# Patient Record
Sex: MALE | URBAN - METROPOLITAN AREA
[De-identification: names, ages, dates, MRNs, and addresses within clinical notes are randomized per-mention and may not be internally consistent; named-entity substitution may affect disease eponyms.]

---

## 2020-01-01 ENCOUNTER — HOSPITAL ENCOUNTER (OUTPATIENT)
Facility: CLINIC | Age: 50
Discharge: HOME | End: 2020-06-19
Attending: INTERNAL MEDICINE | Admitting: INTERNAL MEDICINE
Payer: COMMERCIAL

## 2020-01-01 ENCOUNTER — APPOINTMENT (OUTPATIENT)
Dept: CT IMAGING | Facility: CLINIC | Age: 50
DRG: 003 | End: 2020-01-01
Attending: INTERNAL MEDICINE
Payer: COMMERCIAL

## 2020-01-01 ENCOUNTER — HOSPITAL ENCOUNTER (INPATIENT)
Facility: CLINIC | Age: 50
LOS: 1 days | DRG: 003 | End: 2020-06-20
Admitting: INTERNAL MEDICINE
Payer: COMMERCIAL

## 2020-01-01 ENCOUNTER — DOCUMENTATION ONLY (OUTPATIENT)
Dept: OTHER | Facility: CLINIC | Age: 50
End: 2020-01-01

## 2020-01-01 ENCOUNTER — APPOINTMENT (OUTPATIENT)
Dept: CARDIOLOGY | Facility: CLINIC | Age: 50
DRG: 003 | End: 2020-01-01
Attending: INTERNAL MEDICINE
Payer: COMMERCIAL

## 2020-01-01 ENCOUNTER — APPOINTMENT (OUTPATIENT)
Dept: GENERAL RADIOLOGY | Facility: CLINIC | Age: 50
DRG: 003 | End: 2020-01-01
Attending: INTERNAL MEDICINE
Payer: COMMERCIAL

## 2020-01-01 ENCOUNTER — APPOINTMENT (OUTPATIENT)
Dept: INTERVENTIONAL RADIOLOGY/VASCULAR | Facility: CLINIC | Age: 50
End: 2020-01-01
Attending: INTERNAL MEDICINE
Payer: COMMERCIAL

## 2020-01-01 ENCOUNTER — APPOINTMENT (OUTPATIENT)
Dept: GENERAL RADIOLOGY | Facility: CLINIC | Age: 50
DRG: 003 | End: 2020-01-01
Payer: COMMERCIAL

## 2020-01-01 ENCOUNTER — APPOINTMENT (OUTPATIENT)
Dept: ULTRASOUND IMAGING | Facility: CLINIC | Age: 50
DRG: 003 | End: 2020-01-01
Attending: INTERNAL MEDICINE
Payer: COMMERCIAL

## 2020-01-01 VITALS
TEMPERATURE: 93.9 F | HEIGHT: 76 IN | RESPIRATION RATE: 12 BRPM | BODY MASS INDEX: 38.36 KG/M2 | OXYGEN SATURATION: 100 % | WEIGHT: 315 LBS

## 2020-01-01 VITALS
RESPIRATION RATE: 27 BRPM | SYSTOLIC BLOOD PRESSURE: 142 MMHG | TEMPERATURE: 93.2 F | HEART RATE: 75 BPM | OXYGEN SATURATION: 100 % | DIASTOLIC BLOOD PRESSURE: 62 MMHG

## 2020-01-01 DIAGNOSIS — I46.9 CARDIAC ARREST (H): ICD-10-CM

## 2020-01-01 LAB
ABO + RH BLD: NORMAL
ALBUMIN SERPL-MCNC: 2.4 G/DL (ref 3.4–5)
ALBUMIN SERPL-MCNC: 2.4 G/DL (ref 3.4–5)
ALP SERPL-CCNC: 205 U/L (ref 40–150)
ALP SERPL-CCNC: 218 U/L (ref 40–150)
ALT SERPL W P-5'-P-CCNC: 884 U/L (ref 0–70)
ALT SERPL W P-5'-P-CCNC: 964 U/L (ref 0–70)
ANGLE RATE OF CLOT STRENGTH: 9.7 DEGREES (ref 53–72)
ANION GAP SERPL CALCULATED.3IONS-SCNC: 27 MMOL/L (ref 3–14)
ANION GAP SERPL CALCULATED.3IONS-SCNC: 28 MMOL/L (ref 3–14)
ANION GAP SERPL CALCULATED.3IONS-SCNC: NORMAL MMOL/L (ref 6–17)
APTT PPP: 43 SEC (ref 22–37)
APTT PPP: 48 SEC (ref 22–37)
APTT PPP: 95 SEC (ref 22–37)
AST SERPL W P-5'-P-CCNC: 1984 U/L (ref 0–45)
AST SERPL W P-5'-P-CCNC: 2097 U/L (ref 0–45)
AT III ACT/NOR PPP CHRO: 53 % (ref 85–135)
AT III ACT/NOR PPP CHRO: 54 % (ref 85–135)
B-HCG FREE SERPL-ACNC: 2.4 [IU]/L (ref 0.86–1.14)
BASE DEFICIT BLDA-SCNC: 13.4 MMOL/L
BASE DEFICIT BLDA-SCNC: 14.7 MMOL/L
BASE DEFICIT BLDA-SCNC: 15.5 MMOL/L
BASE DEFICIT BLDA-SCNC: 15.8 MMOL/L
BASE DEFICIT BLDA-SCNC: 16.1 MMOL/L
BASE DEFICIT BLDA-SCNC: 17.4 MMOL/L
BASE DEFICIT BLDA-SCNC: 17.5 MMOL/L
BASE DEFICIT BLDA-SCNC: 17.7 MMOL/L
BASE DEFICIT BLDA-SCNC: 19.1 MMOL/L
BASE DEFICIT BLDA-SCNC: 26.2 MMOL/L
BASE DEFICIT BLDV-SCNC: 16.9 MMOL/L
BASOPHILS # BLD AUTO: 0 10E9/L (ref 0–0.2)
BASOPHILS NFR BLD AUTO: 0 %
BILIRUB SERPL-MCNC: 1.1 MG/DL (ref 0.2–1.3)
BILIRUB SERPL-MCNC: 1.3 MG/DL (ref 0.2–1.3)
BLD GP AB SCN SERPL QL: NORMAL
BLD GP AB SCN SERPL QL: NORMAL
BLD PROD TYP BPU: NORMAL
BLD UNIT ID BPU: 0
BLOOD BANK CMNT PATIENT-IMP: NORMAL
BLOOD PRODUCT CODE: NORMAL
BPU ID: NORMAL
BUN SERPL-MCNC: 37 MG/DL (ref 7–30)
BUN SERPL-MCNC: 44 MG/DL (ref 7–30)
BUN SERPL-MCNC: NORMAL MG/DL (ref 7–30)
BURR CELLS BLD QL SMEAR: ABNORMAL
CA-I BLD-MCNC: 3.3 MG/DL (ref 4.4–5.2)
CA-I BLD-MCNC: 3.3 MG/DL (ref 4.4–5.2)
CA-I BLD-MCNC: 3.4 MG/DL (ref 4.4–5.2)
CA-I BLD-MCNC: 3.6 MG/DL (ref 4.4–5.2)
CA-I BLD-MCNC: 3.7 MG/DL (ref 4.4–5.2)
CA-I BLD-MCNC: 3.8 MG/DL (ref 4.4–5.2)
CA-I BLD-MCNC: 3.9 MG/DL (ref 4.4–5.2)
CA-I BLD-MCNC: 4.1 MG/DL (ref 4.4–5.2)
CA-I BLD-MCNC: 4.3 MG/DL (ref 4.4–5.2)
CA-I BLD-MCNC: 4.7 MG/DL (ref 4.4–5.2)
CA-I BLD-MCNC: 4.8 MG/DL (ref 4.4–5.2)
CALCIUM SERPL-MCNC: 6.9 MG/DL (ref 8.5–10.1)
CALCIUM SERPL-MCNC: 7.2 MG/DL (ref 8.5–10.1)
CALCIUM SERPL-MCNC: NORMAL MG/DL (ref 8.5–10.1)
CHLORIDE SERPL-SCNC: 101 MMOL/L (ref 94–109)
CHLORIDE SERPL-SCNC: 99 MMOL/L (ref 94–109)
CHLORIDE SERPL-SCNC: NORMAL MMOL/L (ref 94–109)
CI HYPOCOAGULATION INDEX: 24.9 RATIO (ref 0–3)
CO2 SERPL-SCNC: 12 MMOL/L (ref 20–32)
CO2 SERPL-SCNC: 13 MMOL/L (ref 20–32)
CO2 SERPL-SCNC: NORMAL MMOL/L (ref 20–32)
CORTIS SERPL-MCNC: 53.2 UG/DL (ref 4–22)
CREAT SERPL-MCNC: 3.52 MG/DL (ref 0.66–1.25)
CREAT SERPL-MCNC: 3.88 MG/DL (ref 0.66–1.25)
CREAT SERPL-MCNC: NORMAL MG/DL (ref 0.66–1.25)
CRP SERPL-MCNC: 61 MG/L (ref 0–8)
CRP SERPL-MCNC: 67 MG/L (ref 0–8)
D DIMER PPP FEU-MCNC: >20 UG/ML FEU (ref 0–0.5)
D DIMER PPP FEU-MCNC: >20 UG/ML FEU (ref 0–0.5)
DIFFERENTIAL METHOD BLD: ABNORMAL
EOSINOPHIL # BLD AUTO: 0 10E9/L (ref 0–0.7)
EOSINOPHIL NFR BLD AUTO: 0 %
ERYTHROCYTE [DISTWIDTH] IN BLOOD BY AUTOMATED COUNT: 12.9 % (ref 10–15)
ERYTHROCYTE [DISTWIDTH] IN BLOOD BY AUTOMATED COUNT: 13.2 % (ref 10–15)
ERYTHROCYTE [DISTWIDTH] IN BLOOD BY AUTOMATED COUNT: 13.2 % (ref 10–15)
ERYTHROCYTE [DISTWIDTH] IN BLOOD BY AUTOMATED COUNT: 13.6 % (ref 10–15)
ERYTHROCYTE [SEDIMENTATION RATE] IN BLOOD BY WESTERGREN METHOD: 10 MM/H (ref 0–15)
ERYTHROCYTE [SEDIMENTATION RATE] IN BLOOD BY WESTERGREN METHOD: 6 MM/H (ref 0–15)
FIBRINOGEN PPP-MCNC: <61 MG/DL (ref 200–420)
FIBRINOGEN PPP-MCNC: <61 MG/DL (ref 200–420)
G ACTUAL CLOT STRENGTH: 1.7 KD/SC (ref 4.5–11)
GFR SERPL CREATININE-BSD FRML MDRD: 12 ML/MIN/{1.73_M2}
GFR SERPL CREATININE-BSD FRML MDRD: 17 ML/MIN/{1.73_M2}
GFR SERPL CREATININE-BSD FRML MDRD: NORMAL ML/MIN/{1.73_M2}
GLUCOSE BLD-MCNC: 149 MG/DL (ref 70–99)
GLUCOSE BLD-MCNC: 154 MG/DL (ref 70–99)
GLUCOSE BLD-MCNC: 162 MG/DL (ref 70–99)
GLUCOSE BLD-MCNC: 193 MG/DL (ref 70–99)
GLUCOSE BLD-MCNC: 235 MG/DL (ref 70–99)
GLUCOSE BLD-MCNC: 277 MG/DL (ref 70–99)
GLUCOSE BLD-MCNC: 330 MG/DL (ref 70–99)
GLUCOSE BLD-MCNC: 384 MG/DL (ref 70–99)
GLUCOSE BLDC GLUCOMTR-MCNC: 148 MG/DL (ref 70–99)
GLUCOSE BLDC GLUCOMTR-MCNC: 265 MG/DL (ref 70–99)
GLUCOSE BLDC GLUCOMTR-MCNC: 330 MG/DL (ref 70–99)
GLUCOSE BLDC GLUCOMTR-MCNC: 345 MG/DL (ref 70–99)
GLUCOSE BLDC GLUCOMTR-MCNC: 354 MG/DL (ref 70–99)
GLUCOSE BLDC GLUCOMTR-MCNC: 372 MG/DL (ref 70–99)
GLUCOSE BLDC GLUCOMTR-MCNC: 388 MG/DL (ref 70–99)
GLUCOSE SERPL-MCNC: 323 MG/DL (ref 70–99)
GLUCOSE SERPL-MCNC: 391 MG/DL (ref 70–99)
GLUCOSE SERPL-MCNC: NORMAL MG/DL (ref 70–99)
HBA1C MFR BLD: 5.9 % (ref 0–5.6)
HCO3 BLD-SCNC: 11 MMOL/L (ref 21–28)
HCO3 BLD-SCNC: 12 MMOL/L (ref 21–28)
HCO3 BLD-SCNC: 13 MMOL/L (ref 21–28)
HCO3 BLD-SCNC: 14 MMOL/L (ref 21–28)
HCO3 BLD-SCNC: 6 MMOL/L (ref 21–28)
HCO3 BLDA-SCNC: 11 MMOL/L (ref 21–28)
HCO3 BLDV-SCNC: 12 MMOL/L (ref 21–28)
HCT VFR BLD AUTO: 31.2 % (ref 40–53)
HCT VFR BLD AUTO: 37.8 % (ref 40–53)
HCT VFR BLD AUTO: 39.2 % (ref 40–53)
HCT VFR BLD AUTO: 40.8 % (ref 40–53)
HGB BLD-MCNC: 10.1 G/DL (ref 13.3–17.7)
HGB BLD-MCNC: 12 G/DL (ref 13.3–17.7)
HGB BLD-MCNC: 12.2 G/DL (ref 13.3–17.7)
HGB BLD-MCNC: 12.4 G/DL (ref 13.3–17.7)
HGB BLD-MCNC: 12.6 G/DL (ref 13.3–17.7)
HGB BLD-MCNC: 12.7 G/DL (ref 13.3–17.7)
HGB BLD-MCNC: 13.6 G/DL (ref 13.3–17.7)
HGB BLD-MCNC: 13.7 G/DL (ref 13.3–17.7)
HGB BLD-MCNC: 15.1 G/DL (ref 13.3–17.7)
HGB BLD-MCNC: 15.8 G/DL (ref 13.3–17.7)
HGB FREE PLAS-MCNC: 110 MG/DL
HGB FREE PLAS-MCNC: 70 MG/DL
IGNF SERPL-MCNC: 1 PG/ML
IL10 SERPL-MCNC: 2456 PG/ML
IL18 SERPL-MCNC: 571 PG/ML
INR PPP: 2.19 (ref 0.86–1.14)
INR PPP: 2.3 (ref 0.86–1.14)
INR PPP: 3.44 (ref 0.86–1.14)
INR PPP: 6.58 (ref 0.86–1.14)
INTERPRETATION ECG - MUSE: NORMAL
K TIME TO SPEC CLOT STRENGTH: 26.1 MINUTE (ref 1–3)
KCT BLD-ACNC: 188 SEC (ref 75–150)
KCT BLD-ACNC: 196 SEC (ref 75–150)
KCT BLD-ACNC: 238 SEC (ref 75–150)
KCT BLD-ACNC: 251 SEC (ref 75–150)
KCT BLD-ACNC: 327 SEC (ref 75–150)
KCT BLD-ACNC: 348 SEC (ref 75–150)
KCT BLD-ACNC: 399 SEC (ref 75–150)
LACTATE BLD-SCNC: 14.9 MMOL/L (ref 0.7–2)
LACTATE BLD-SCNC: 16 MMOL/L (ref 0.7–2)
LACTATE BLD-SCNC: 17 MMOL/L (ref 0.7–2)
LACTATE BLD-SCNC: 17 MMOL/L (ref 0.7–2)
LACTATE BLD-SCNC: 18 MMOL/L (ref 0.7–2)
LACTATE BLD-SCNC: 20 MMOL/L (ref 0.7–2)
LDH SERPL L TO P-CCNC: 3230 U/L (ref 85–227)
LDH SERPL L TO P-CCNC: 3310 U/L (ref 85–227)
LMWH PPP CHRO-ACNC: <0.1 IU/ML
LY30 LYSIS AT 30 MINUTES: 0 % (ref 0–8)
LY60 LYSIS AT 60 MINUTES: 0 % (ref 0–15)
LYMPHOCYTES # BLD AUTO: 3.3 10E9/L (ref 0.8–5.3)
LYMPHOCYTES NFR BLD AUTO: 10.5 %
MA MAXIMUM CLOT STRENGTH: 25.9 MM (ref 50–70)
MAGNESIUM SERPL-MCNC: 2.8 MG/DL (ref 1.6–2.3)
MAGNESIUM SERPL-MCNC: 3.2 MG/DL (ref 1.6–2.3)
MCH RBC QN AUTO: 30.5 PG (ref 26.5–33)
MCH RBC QN AUTO: 30.7 PG (ref 26.5–33)
MCHC RBC AUTO-ENTMCNC: 29.4 G/DL (ref 31.5–36.5)
MCHC RBC AUTO-ENTMCNC: 32.1 G/DL (ref 31.5–36.5)
MCHC RBC AUTO-ENTMCNC: 32.3 G/DL (ref 31.5–36.5)
MCHC RBC AUTO-ENTMCNC: 32.4 G/DL (ref 31.5–36.5)
MCV RBC AUTO: 104 FL (ref 78–100)
MCV RBC AUTO: 94 FL (ref 78–100)
MCV RBC AUTO: 95 FL (ref 78–100)
MCV RBC AUTO: 95 FL (ref 78–100)
MONOCYTES # BLD AUTO: 0.7 10E9/L (ref 0–1.3)
MONOCYTES NFR BLD AUTO: 2.4 %
MYELOCYTES # BLD: 0.5 10E9/L
MYELOCYTES NFR BLD MANUAL: 1.6 %
NEUTROPHILS # BLD AUTO: 26.6 10E9/L (ref 1.6–8.3)
NEUTROPHILS NFR BLD AUTO: 85.5 %
NUM BPU REQUESTED: 0
NUM BPU REQUESTED: 0
NUM BPU REQUESTED: 1
NUM BPU REQUESTED: 2
NUM BPU REQUESTED: 5
NUM BPU REQUESTED: 5
O2/TOTAL GAS SETTING VFR VENT: 100 %
O2/TOTAL GAS SETTING VFR VENT: 100 %
O2/TOTAL GAS SETTING VFR VENT: 60 %
OXYHGB MFR BLD: 74 % (ref 92–100)
OXYHGB MFR BLD: 82 % (ref 92–100)
OXYHGB MFR BLD: 94 % (ref 92–100)
OXYHGB MFR BLD: 95 % (ref 92–100)
OXYHGB MFR BLD: 98 % (ref 92–100)
OXYHGB MFR BLDA: 99 % (ref 75–100)
OXYHGB MFR BLDV: 76 %
PCO2 BLD: 31 MM HG (ref 35–45)
PCO2 BLD: 31 MM HG (ref 35–45)
PCO2 BLD: 33 MM HG (ref 35–45)
PCO2 BLD: 34 MM HG (ref 35–45)
PCO2 BLD: 36 MM HG (ref 35–45)
PCO2 BLD: 37 MM HG (ref 35–45)
PCO2 BLD: 38 MM HG (ref 35–45)
PCO2 BLDA: 33 MM HG (ref 35–45)
PCO2 BLDV: 41 MM HG (ref 40–50)
PH BLD: 6.88 PH (ref 7.35–7.45)
PH BLD: 7.05 PH (ref 7.35–7.45)
PH BLD: 7.1 PH (ref 7.35–7.45)
PH BLD: 7.12 PH (ref 7.35–7.45)
PH BLD: 7.17 PH (ref 7.35–7.45)
PH BLD: 7.18 PH (ref 7.35–7.45)
PH BLD: 7.19 PH (ref 7.35–7.45)
PH BLDA: 7.12 PH (ref 7.35–7.45)
PH BLDV: 7.09 PH (ref 7.32–7.43)
PHOSPHATE SERPL-MCNC: 10.8 MG/DL (ref 2.5–4.5)
PHOSPHATE SERPL-MCNC: 14 MG/DL (ref 2.5–4.5)
PLATELET # BLD AUTO: 106 10E9/L (ref 150–450)
PLATELET # BLD AUTO: 146 10E9/L (ref 150–450)
PLATELET # BLD AUTO: 152 10E9/L (ref 150–450)
PLATELET # BLD AUTO: 178 10E9/L (ref 150–450)
PO2 BLD: 131 MM HG (ref 80–105)
PO2 BLD: 177 MM HG (ref 80–105)
PO2 BLD: 213 MM HG (ref 80–105)
PO2 BLD: 223 MM HG (ref 80–105)
PO2 BLD: 233 MM HG (ref 80–105)
PO2 BLD: 234 MM HG (ref 80–105)
PO2 BLD: 251 MM HG (ref 80–105)
PO2 BLD: 259 MM HG (ref 80–105)
PO2 BLD: 83 MM HG (ref 80–105)
PO2 BLDA: 288 MM HG (ref 80–105)
PO2 BLDV: 57 MM HG (ref 25–47)
POIKILOCYTOSIS BLD QL SMEAR: ABNORMAL
POTASSIUM BLD-SCNC: 4.5 MMOL/L (ref 3.4–5.3)
POTASSIUM BLD-SCNC: 5 MMOL/L (ref 3.4–5.3)
POTASSIUM BLD-SCNC: 5.6 MMOL/L (ref 3.4–5.3)
POTASSIUM BLD-SCNC: 6 MMOL/L (ref 3.4–5.3)
POTASSIUM BLD-SCNC: 6.4 MMOL/L (ref 3.4–5.3)
POTASSIUM BLD-SCNC: 9.1 MMOL/L (ref 3.4–5.3)
POTASSIUM BLD-SCNC: 9.5 MMOL/L (ref 3.4–5.3)
POTASSIUM SERPL-SCNC: 3.3 MMOL/L (ref 3.4–5.3)
POTASSIUM SERPL-SCNC: 4.1 MMOL/L (ref 3.4–5.3)
POTASSIUM SERPL-SCNC: NORMAL MMOL/L (ref 3.4–5.3)
PROCALCITONIN SERPL-MCNC: 18.25 NG/ML
PROT SERPL-MCNC: 5.4 G/DL (ref 6.8–8.8)
PROT SERPL-MCNC: 5.6 G/DL (ref 6.8–8.8)
R TIME UNTIL CLOT FORMS: 17.1 MINUTE (ref 5–10)
RADIOLOGIST FLAGS: ABNORMAL
RBC # BLD AUTO: 3.31 10E12/L (ref 4.4–5.9)
RBC # BLD AUTO: 3.94 10E12/L (ref 4.4–5.9)
RBC # BLD AUTO: 3.97 10E12/L (ref 4.4–5.9)
RBC # BLD AUTO: 4.13 10E12/L (ref 4.4–5.9)
SODIUM BLD-SCNC: 130 MMOL/L (ref 133–144)
SODIUM BLD-SCNC: 136 MMOL/L (ref 133–144)
SODIUM BLD-SCNC: 137 MMOL/L (ref 133–144)
SODIUM BLD-SCNC: 139 MMOL/L (ref 133–144)
SODIUM BLD-SCNC: 140 MMOL/L (ref 133–144)
SODIUM BLD-SCNC: 141 MMOL/L (ref 133–144)
SODIUM SERPL-SCNC: 139 MMOL/L (ref 133–144)
SODIUM SERPL-SCNC: 141 MMOL/L (ref 133–144)
SODIUM SERPL-SCNC: NORMAL MMOL/L (ref 133–144)
SOLUBLE IL-2R ALPHA: 7126 PG/ML
SPECIMEN EXP DATE BLD: NORMAL
SPECIMEN EXP DATE BLD: NORMAL
TRANSFUSION STATUS PATIENT QL: NORMAL
TROPONIN I SERPL-MCNC: 185.35 UG/L (ref 0–0.04)
TROPONIN I SERPL-MCNC: >200 UG/L (ref 0–0.04)
WBC # BLD AUTO: 10.3 10E9/L (ref 4–11)
WBC # BLD AUTO: 15.4 10E9/L (ref 4–11)
WBC # BLD AUTO: 25 10E9/L (ref 4–11)
WBC # BLD AUTO: 31.1 10E9/L (ref 4–11)

## 2020-01-01 PROCEDURE — 40000986 XR CHEST PORT 1 VW

## 2020-01-01 PROCEDURE — 83051 HEMOGLOBIN PLASMA: CPT | Performed by: INTERNAL MEDICINE

## 2020-01-01 PROCEDURE — C9601 PERC DRUG-EL COR STENT BRAN: HCPCS | Mod: LD | Performed by: INTERNAL MEDICINE

## 2020-01-01 PROCEDURE — C1874 STENT, COATED/COV W/DEL SYS: HCPCS | Performed by: INTERNAL MEDICINE

## 2020-01-01 PROCEDURE — 36015 PLACE CATHETER IN ARTERY: CPT | Mod: XS

## 2020-01-01 PROCEDURE — 27210794 ZZH OR GENERAL SUPPLY STERILE: Performed by: INTERNAL MEDICINE

## 2020-01-01 PROCEDURE — 27210888 ZZH ACCESSORY CR7

## 2020-01-01 PROCEDURE — C1769 GUIDE WIRE: HCPCS

## 2020-01-01 PROCEDURE — 25000125 ZZHC RX 250: Performed by: RADIOLOGY

## 2020-01-01 PROCEDURE — 40000281 ZZH STATISTIC TRANSPORT TIME EA 15 MIN

## 2020-01-01 PROCEDURE — 82533 TOTAL CORTISOL: CPT | Performed by: INTERNAL MEDICINE

## 2020-01-01 PROCEDURE — 86140 C-REACTIVE PROTEIN: CPT | Performed by: INTERNAL MEDICINE

## 2020-01-01 PROCEDURE — 85520 HEPARIN ASSAY: CPT | Performed by: INTERNAL MEDICINE

## 2020-01-01 PROCEDURE — 84132 ASSAY OF SERUM POTASSIUM: CPT | Performed by: INTERNAL MEDICINE

## 2020-01-01 PROCEDURE — 86923 COMPATIBILITY TEST ELECTRIC: CPT | Performed by: INTERNAL MEDICINE

## 2020-01-01 PROCEDURE — 85027 COMPLETE CBC AUTOMATED: CPT | Performed by: INTERNAL MEDICINE

## 2020-01-01 PROCEDURE — 27210807 ZZH SHEATH CR6

## 2020-01-01 PROCEDURE — 27211192 ZZ H SHEATH CR14

## 2020-01-01 PROCEDURE — 83036 HEMOGLOBIN GLYCOSYLATED A1C: CPT | Performed by: INTERNAL MEDICINE

## 2020-01-01 PROCEDURE — 20000004 ZZH R&B ICU UMMC

## 2020-01-01 PROCEDURE — 84295 ASSAY OF SERUM SODIUM: CPT

## 2020-01-01 PROCEDURE — 40000076 ZZH STATISTIC IABP MONITORING

## 2020-01-01 PROCEDURE — 40000275 ZZH STATISTIC RCP TIME EA 10 MIN

## 2020-01-01 PROCEDURE — 82805 BLOOD GASES W/O2 SATURATION: CPT | Performed by: INTERNAL MEDICINE

## 2020-01-01 PROCEDURE — 25000128 H RX IP 250 OP 636: Performed by: INTERNAL MEDICINE

## 2020-01-01 PROCEDURE — 93925 LOWER EXTREMITY STUDY: CPT

## 2020-01-01 PROCEDURE — 41000018 ZZH PER-PERFUSION 1ST 30 MIN: Performed by: INTERNAL MEDICINE

## 2020-01-01 PROCEDURE — 5A02210 ASSISTANCE WITH CARDIAC OUTPUT USING BALLOON PUMP, CONTINUOUS: ICD-10-PCS | Performed by: INTERNAL MEDICINE

## 2020-01-01 PROCEDURE — 25000128 H RX IP 250 OP 636: Performed by: RADIOLOGY

## 2020-01-01 PROCEDURE — 25000132 ZZH RX MED GY IP 250 OP 250 PS 637: Performed by: INTERNAL MEDICINE

## 2020-01-01 PROCEDURE — 27210893 ZZH CATH CR5

## 2020-01-01 PROCEDURE — 83615 LACTATE (LD) (LDH) ENZYME: CPT | Performed by: INTERNAL MEDICINE

## 2020-01-01 PROCEDURE — 33947 ECMO/ECLS INITIATION ARTERY: CPT

## 2020-01-01 PROCEDURE — 70450 CT HEAD/BRAIN W/O DYE: CPT

## 2020-01-01 PROCEDURE — 84145 PROCALCITONIN (PCT): CPT | Performed by: INTERNAL MEDICINE

## 2020-01-01 PROCEDURE — 86316 IMMUNOASSAY TUMOR OTHER: CPT | Performed by: INTERNAL MEDICINE

## 2020-01-01 PROCEDURE — 25800030 ZZH RX IP 258 OP 636: Performed by: INTERNAL MEDICINE

## 2020-01-01 PROCEDURE — 25800030 ZZH RX IP 258 OP 636: Performed by: RADIOLOGY

## 2020-01-01 PROCEDURE — 86901 BLOOD TYPING SEROLOGIC RH(D): CPT | Performed by: INTERNAL MEDICINE

## 2020-01-01 PROCEDURE — 85652 RBC SED RATE AUTOMATED: CPT | Performed by: INTERNAL MEDICINE

## 2020-01-01 PROCEDURE — C9113 INJ PANTOPRAZOLE SODIUM, VIA: HCPCS | Performed by: INTERNAL MEDICINE

## 2020-01-01 PROCEDURE — 93005 ELECTROCARDIOGRAM TRACING: CPT

## 2020-01-01 PROCEDURE — 27210889 ZZH ACCESSORY CR8

## 2020-01-01 PROCEDURE — 33949 ECMO/ECLS DAILY MGMT ARTERY: CPT

## 2020-01-01 PROCEDURE — 37187 VENOUS MECH THROMBECTOMY: CPT

## 2020-01-01 PROCEDURE — 85300 ANTITHROMBIN III ACTIVITY: CPT | Performed by: INTERNAL MEDICINE

## 2020-01-01 PROCEDURE — 82330 ASSAY OF CALCIUM: CPT

## 2020-01-01 PROCEDURE — 99222 1ST HOSP IP/OBS MODERATE 55: CPT | Performed by: INTERNAL MEDICINE

## 2020-01-01 PROCEDURE — C1887 CATHETER, GUIDING: HCPCS | Performed by: INTERNAL MEDICINE

## 2020-01-01 PROCEDURE — 40000344 ZZHCL STATISTIC THAWING COMPONENT: Performed by: INTERNAL MEDICINE

## 2020-01-01 PROCEDURE — C9600 PERC DRUG-EL COR STENT SING: HCPCS | Mod: LD | Performed by: INTERNAL MEDICINE

## 2020-01-01 PROCEDURE — 27210738 ZZH ACCESSORY CR2

## 2020-01-01 PROCEDURE — 27211119 ZZH ARTERIAL CANNULA 15-17 FRENCH: Performed by: INTERNAL MEDICINE

## 2020-01-01 PROCEDURE — 40000081 ZZH STATISTIC INSERT IABP

## 2020-01-01 PROCEDURE — B31S1ZZ FLUOROSCOPY OF RIGHT PULMONARY ARTERY USING LOW OSMOLAR CONTRAST: ICD-10-PCS | Performed by: INTERNAL MEDICINE

## 2020-01-01 PROCEDURE — 37212 THROMBOLYTIC VENOUS THERAPY: CPT

## 2020-01-01 PROCEDURE — 85610 PROTHROMBIN TIME: CPT

## 2020-01-01 PROCEDURE — 25800030 ZZH RX IP 258 OP 636

## 2020-01-01 PROCEDURE — C1725 CATH, TRANSLUMIN NON-LASER: HCPCS | Performed by: INTERNAL MEDICINE

## 2020-01-01 PROCEDURE — 85610 PROTHROMBIN TIME: CPT | Performed by: INTERNAL MEDICINE

## 2020-01-01 PROCEDURE — 82947 ASSAY GLUCOSE BLOOD QUANT: CPT | Performed by: INTERNAL MEDICINE

## 2020-01-01 PROCEDURE — 93568 NJX CAR CTH NSLC P-ART ANGRP: CPT | Performed by: INTERNAL MEDICINE

## 2020-01-01 PROCEDURE — 25000125 ZZHC RX 250

## 2020-01-01 PROCEDURE — 99153 MOD SED SAME PHYS/QHP EA: CPT | Performed by: INTERNAL MEDICINE

## 2020-01-01 PROCEDURE — 82803 BLOOD GASES ANY COMBINATION: CPT | Performed by: INTERNAL MEDICINE

## 2020-01-01 PROCEDURE — 99291 CRITICAL CARE FIRST HOUR: CPT | Mod: 25 | Performed by: INTERNAL MEDICINE

## 2020-01-01 PROCEDURE — 85347 COAGULATION TIME ACTIVATED: CPT

## 2020-01-01 PROCEDURE — 82947 ASSAY GLUCOSE BLOOD QUANT: CPT

## 2020-01-01 PROCEDURE — 99152 MOD SED SAME PHYS/QHP 5/>YRS: CPT | Performed by: INTERNAL MEDICINE

## 2020-01-01 PROCEDURE — 94002 VENT MGMT INPAT INIT DAY: CPT

## 2020-01-01 PROCEDURE — 83605 ASSAY OF LACTIC ACID: CPT | Performed by: INTERNAL MEDICINE

## 2020-01-01 PROCEDURE — 99292 CRITICAL CARE ADDL 30 MIN: CPT | Mod: 25 | Performed by: INTERNAL MEDICINE

## 2020-01-01 PROCEDURE — 86850 RBC ANTIBODY SCREEN: CPT | Performed by: INTERNAL MEDICINE

## 2020-01-01 PROCEDURE — 36620 INSERTION CATHETER ARTERY: CPT | Performed by: INTERNAL MEDICINE

## 2020-01-01 PROCEDURE — 74176 CT ABD & PELVIS W/O CONTRAST: CPT

## 2020-01-01 PROCEDURE — 84132 ASSAY OF SERUM POTASSIUM: CPT

## 2020-01-01 PROCEDURE — B2111ZZ FLUOROSCOPY OF MULTIPLE CORONARY ARTERIES USING LOW OSMOLAR CONTRAST: ICD-10-PCS | Performed by: INTERNAL MEDICINE

## 2020-01-01 PROCEDURE — 93454 CORONARY ARTERY ANGIO S&I: CPT | Performed by: INTERNAL MEDICINE

## 2020-01-01 PROCEDURE — 86900 BLOOD TYPING SEROLOGIC ABO: CPT | Performed by: INTERNAL MEDICINE

## 2020-01-01 PROCEDURE — 25000125 ZZHC RX 250: Performed by: INTERNAL MEDICINE

## 2020-01-01 PROCEDURE — 82803 BLOOD GASES ANY COMBINATION: CPT

## 2020-01-01 PROCEDURE — C1894 INTRO/SHEATH, NON-LASER: HCPCS | Performed by: INTERNAL MEDICINE

## 2020-01-01 PROCEDURE — 33952 ECMO/ECLS INSJ PRPH CANNULA: CPT | Performed by: INTERNAL MEDICINE

## 2020-01-01 PROCEDURE — 0270356 DILATION OF CORONARY ARTERY, ONE ARTERY, BIFURCATION, WITH TWO DRUG-ELUTING INTRALUMINAL DEVICES, PERCUTANEOUS APPROACH: ICD-10-PCS | Performed by: INTERNAL MEDICINE

## 2020-01-01 PROCEDURE — 93306 TTE W/DOPPLER COMPLETE: CPT | Mod: 26 | Performed by: INTERNAL MEDICINE

## 2020-01-01 PROCEDURE — 85379 FIBRIN DEGRADATION QUANT: CPT | Performed by: INTERNAL MEDICINE

## 2020-01-01 PROCEDURE — 80347 BENZODIAZEPINES 13 OR MORE: CPT | Performed by: INTERNAL MEDICINE

## 2020-01-01 PROCEDURE — 83735 ASSAY OF MAGNESIUM: CPT | Performed by: INTERNAL MEDICINE

## 2020-01-01 PROCEDURE — 25500064 ZZH RX 255 OP 636: Performed by: RADIOLOGY

## 2020-01-01 PROCEDURE — C1757 CATH, THROMBECTOMY/EMBOLECT: HCPCS

## 2020-01-01 PROCEDURE — P9016 RBC LEUKOCYTES REDUCED: HCPCS | Performed by: INTERNAL MEDICINE

## 2020-01-01 PROCEDURE — P9059 PLASMA, FRZ BETWEEN 8-24HOUR: HCPCS | Performed by: INTERNAL MEDICINE

## 2020-01-01 PROCEDURE — 36556 INSERT NON-TUNNEL CV CATH: CPT | Performed by: INTERNAL MEDICINE

## 2020-01-01 PROCEDURE — 33949 ECMO/ECLS DAILY MGMT ARTERY: CPT | Performed by: INTERNAL MEDICINE

## 2020-01-01 PROCEDURE — 99232 SBSQ HOSP IP/OBS MODERATE 35: CPT | Mod: 25 | Performed by: INTERNAL MEDICINE

## 2020-01-01 PROCEDURE — 84295 ASSAY OF SERUM SODIUM: CPT | Performed by: INTERNAL MEDICINE

## 2020-01-01 PROCEDURE — 75743 ARTERY X-RAYS LUNGS: CPT

## 2020-01-01 PROCEDURE — 80307 DRUG TEST PRSMV CHEM ANLYZR: CPT | Performed by: INTERNAL MEDICINE

## 2020-01-01 PROCEDURE — 5A1935Z RESPIRATORY VENTILATION, LESS THAN 24 CONSECUTIVE HOURS: ICD-10-PCS | Performed by: INTERNAL MEDICINE

## 2020-01-01 PROCEDURE — 33967 INSERT I-AORT PERCUT DEVICE: CPT | Performed by: INTERNAL MEDICINE

## 2020-01-01 PROCEDURE — C1887 CATHETER, GUIDING: HCPCS

## 2020-01-01 PROCEDURE — 36014 PLACE CATHETER IN ARTERY: CPT | Mod: 50

## 2020-01-01 PROCEDURE — 84100 ASSAY OF PHOSPHORUS: CPT | Performed by: INTERNAL MEDICINE

## 2020-01-01 PROCEDURE — 27211184 ZZH CARDIOHELP CIRCUIT

## 2020-01-01 PROCEDURE — 83520 IMMUNOASSAY QUANT NOS NONAB: CPT | Performed by: INTERNAL MEDICINE

## 2020-01-01 PROCEDURE — 27210305 ZZH CATH BALLOON IABP

## 2020-01-01 PROCEDURE — 93306 TTE W/DOPPLER COMPLETE: CPT

## 2020-01-01 PROCEDURE — P9012 CRYOPRECIPITATE EACH UNIT: HCPCS | Performed by: INTERNAL MEDICINE

## 2020-01-01 PROCEDURE — B31T1ZZ FLUOROSCOPY OF LEFT PULMONARY ARTERY USING LOW OSMOLAR CONTRAST: ICD-10-PCS | Performed by: INTERNAL MEDICINE

## 2020-01-01 PROCEDURE — 40000014 ZZH STATISTIC ARTERIAL MONITORING DAILY

## 2020-01-01 PROCEDURE — 82810 BLOOD GASES O2 SAT ONLY: CPT

## 2020-01-01 PROCEDURE — 27211402 ZZH SENSOR NIRS OXIMETER, ADULT

## 2020-01-01 PROCEDURE — 82330 ASSAY OF CALCIUM: CPT | Performed by: INTERNAL MEDICINE

## 2020-01-01 PROCEDURE — 85384 FIBRINOGEN ACTIVITY: CPT | Performed by: INTERNAL MEDICINE

## 2020-01-01 PROCEDURE — G0463 HOSPITAL OUTPT CLINIC VISIT: HCPCS

## 2020-01-01 PROCEDURE — 40000986 XR ABDOMEN PORT 1 VW

## 2020-01-01 PROCEDURE — 85025 COMPLETE CBC W/AUTO DIFF WBC: CPT | Performed by: INTERNAL MEDICINE

## 2020-01-01 PROCEDURE — C1769 GUIDE WIRE: HCPCS | Performed by: INTERNAL MEDICINE

## 2020-01-01 PROCEDURE — 25000128 H RX IP 250 OP 636

## 2020-01-01 PROCEDURE — 5A1522G EXTRACORPOREAL OXYGENATION, MEMBRANE, PERIPHERAL VENO-ARTERIAL: ICD-10-PCS | Performed by: INTERNAL MEDICINE

## 2020-01-01 PROCEDURE — 83605 ASSAY OF LACTIC ACID: CPT

## 2020-01-01 PROCEDURE — 80053 COMPREHEN METABOLIC PANEL: CPT | Performed by: INTERNAL MEDICINE

## 2020-01-01 PROCEDURE — 85018 HEMOGLOBIN: CPT

## 2020-01-01 PROCEDURE — 84484 ASSAY OF TROPONIN QUANT: CPT | Performed by: INTERNAL MEDICINE

## 2020-01-01 PROCEDURE — 36015 PLACE CATHETER IN ARTERY: CPT | Mod: 50

## 2020-01-01 PROCEDURE — 25000125 ZZHC RX 250: Performed by: STUDENT IN AN ORGANIZED HEALTH CARE EDUCATION/TRAINING PROGRAM

## 2020-01-01 PROCEDURE — 85396 CLOTTING ASSAY WHOLE BLOOD: CPT | Performed by: INTERNAL MEDICINE

## 2020-01-01 PROCEDURE — 00000146 ZZHCL STATISTIC GLUCOSE BY METER IP

## 2020-01-01 PROCEDURE — 82962 GLUCOSE BLOOD TEST: CPT

## 2020-01-01 PROCEDURE — 85730 THROMBOPLASTIN TIME PARTIAL: CPT | Performed by: INTERNAL MEDICINE

## 2020-01-01 DEVICE — STENT RESOLUTE ONYX DE 2.7FR 2.50X12MM RONYX DE25012UX: Type: IMPLANTABLE DEVICE | Status: FUNCTIONAL

## 2020-01-01 DEVICE — IMPLANTABLE DEVICE: Type: IMPLANTABLE DEVICE | Status: FUNCTIONAL

## 2020-01-01 RX ORDER — HEPARIN SODIUM 10000 [USP'U]/100ML
10-50 INJECTION, SOLUTION INTRAVENOUS CONTINUOUS
Status: DISCONTINUED | OUTPATIENT
Start: 2020-01-01 | End: 2020-01-01

## 2020-01-01 RX ORDER — PIPERACILLIN SODIUM, TAZOBACTAM SODIUM 3; .375 G/15ML; G/15ML
3.38 INJECTION, POWDER, LYOPHILIZED, FOR SOLUTION INTRAVENOUS EVERY 6 HOURS
Status: DISCONTINUED | OUTPATIENT
Start: 2020-01-01 | End: 2020-01-01 | Stop reason: HOSPADM

## 2020-01-01 RX ORDER — MIDAZOLAM (PF) 1 MG/ML IN 0.9 % SODIUM CHLORIDE INTRAVENOUS SOLUTION
1-8 CONTINUOUS
Status: DISCONTINUED | OUTPATIENT
Start: 2020-01-01 | End: 2020-01-01 | Stop reason: HOSPADM

## 2020-01-01 RX ORDER — PROPOFOL 10 MG/ML
5-75 INJECTION, EMULSION INTRAVENOUS CONTINUOUS
Status: DISCONTINUED | OUTPATIENT
Start: 2020-01-01 | End: 2020-01-01 | Stop reason: HOSPADM

## 2020-01-01 RX ORDER — IODIXANOL 320 MG/ML
150 INJECTION, SOLUTION INTRAVASCULAR ONCE
Status: COMPLETED | OUTPATIENT
Start: 2020-01-01 | End: 2020-01-01

## 2020-01-01 RX ORDER — HEPARIN SODIUM 1000 [USP'U]/ML
3000 INJECTION, SOLUTION INTRAVENOUS; SUBCUTANEOUS
Status: COMPLETED | OUTPATIENT
Start: 2020-01-01 | End: 2020-01-01

## 2020-01-01 RX ORDER — NITROGLYCERIN 5 MG/ML
VIAL (ML) INTRAVENOUS
Status: DISCONTINUED | OUTPATIENT
Start: 2020-01-01 | End: 2020-01-01 | Stop reason: HOSPADM

## 2020-01-01 RX ORDER — SODIUM CHLORIDE 9 MG/ML
INJECTION, SOLUTION INTRAVENOUS CONTINUOUS
Status: DISCONTINUED | OUTPATIENT
Start: 2020-01-01 | End: 2020-01-01 | Stop reason: HOSPADM

## 2020-01-01 RX ORDER — HEPARIN SODIUM (PORCINE) LOCK FLUSH IV SOLN 100 UNIT/ML 100 UNIT/ML
5-10 SOLUTION INTRAVENOUS EVERY 30 MIN PRN
Status: DISCONTINUED | OUTPATIENT
Start: 2020-01-01 | End: 2020-01-01 | Stop reason: HOSPADM

## 2020-01-01 RX ORDER — HEPARIN SODIUM 200 [USP'U]/100ML
1 INJECTION, SOLUTION INTRAVENOUS CONTINUOUS PRN
Status: DISCONTINUED | OUTPATIENT
Start: 2020-01-01 | End: 2020-01-01 | Stop reason: HOSPADM

## 2020-01-01 RX ORDER — ASPIRIN 81 MG/1
81 TABLET, CHEWABLE ORAL DAILY
Status: DISCONTINUED | OUTPATIENT
Start: 2020-01-01 | End: 2020-01-01

## 2020-01-01 RX ORDER — MIDAZOLAM (PF) 1 MG/ML IN 0.9 % SODIUM CHLORIDE INTRAVENOUS SOLUTION
CONTINUOUS PRN
Status: COMPLETED | OUTPATIENT
Start: 2020-01-01 | End: 2020-01-01

## 2020-01-01 RX ORDER — FENTANYL CITRATE 50 UG/ML
50 INJECTION, SOLUTION INTRAMUSCULAR; INTRAVENOUS ONCE
Status: DISCONTINUED | OUTPATIENT
Start: 2020-01-01 | End: 2020-01-01 | Stop reason: HOSPADM

## 2020-01-01 RX ORDER — IOPAMIDOL 755 MG/ML
INJECTION, SOLUTION INTRAVASCULAR
Status: DISCONTINUED | OUTPATIENT
Start: 2020-01-01 | End: 2020-01-01 | Stop reason: HOSPADM

## 2020-01-01 RX ORDER — HEPARIN SODIUM (PORCINE) LOCK FLUSH IV SOLN 100 UNIT/ML 100 UNIT/ML
5-10 SOLUTION INTRAVENOUS EVERY 30 MIN PRN
Status: DISCONTINUED | OUTPATIENT
Start: 2020-01-01 | End: 2020-01-01

## 2020-01-01 RX ORDER — NOREPINEPHRINE BITARTRATE 0.06 MG/ML
0.03-0.4 INJECTION, SOLUTION INTRAVENOUS CONTINUOUS
Status: DISCONTINUED | OUTPATIENT
Start: 2020-01-01 | End: 2020-01-01 | Stop reason: CLARIF

## 2020-01-01 RX ORDER — NICOTINE POLACRILEX 4 MG
15-30 LOZENGE BUCCAL
Status: DISCONTINUED | OUTPATIENT
Start: 2020-01-01 | End: 2020-01-01

## 2020-01-01 RX ORDER — SODIUM CHLORIDE 9 MG/ML
33 INJECTION, SOLUTION INTRAVENOUS CONTINUOUS
Status: DISCONTINUED | OUTPATIENT
Start: 2020-01-01 | End: 2020-01-01 | Stop reason: HOSPADM

## 2020-01-01 RX ORDER — DEXTROSE MONOHYDRATE 25 G/50ML
25-50 INJECTION, SOLUTION INTRAVENOUS
Status: DISCONTINUED | OUTPATIENT
Start: 2020-01-01 | End: 2020-01-01

## 2020-01-01 RX ORDER — LIDOCAINE 40 MG/G
CREAM TOPICAL
Status: DISCONTINUED | OUTPATIENT
Start: 2020-01-01 | End: 2020-01-01 | Stop reason: HOSPADM

## 2020-01-01 RX ORDER — NICOTINE POLACRILEX 4 MG
15-30 LOZENGE BUCCAL
Status: DISCONTINUED | OUTPATIENT
Start: 2020-01-01 | End: 2020-01-01 | Stop reason: HOSPADM

## 2020-01-01 RX ORDER — HEPARIN SODIUM 10000 [USP'U]/100ML
10-50 INJECTION, SOLUTION INTRAVENOUS CONTINUOUS
Status: DISCONTINUED | OUTPATIENT
Start: 2020-01-01 | End: 2020-01-01 | Stop reason: HOSPADM

## 2020-01-01 RX ORDER — PIPERACILLIN SODIUM, TAZOBACTAM SODIUM 2; .25 G/10ML; G/10ML
2.25 INJECTION, POWDER, LYOPHILIZED, FOR SOLUTION INTRAVENOUS EVERY 6 HOURS
Status: DISCONTINUED | OUTPATIENT
Start: 2020-01-01 | End: 2020-01-01

## 2020-01-01 RX ORDER — NALOXONE HYDROCHLORIDE 0.4 MG/ML
.1-.4 INJECTION, SOLUTION INTRAMUSCULAR; INTRAVENOUS; SUBCUTANEOUS
Status: DISCONTINUED | OUTPATIENT
Start: 2020-01-01 | End: 2020-01-01 | Stop reason: HOSPADM

## 2020-01-01 RX ORDER — HEPARIN SODIUM 10000 [USP'U]/100ML
100-1000 INJECTION, SOLUTION INTRAVENOUS CONTINUOUS PRN
Status: DISCONTINUED | OUTPATIENT
Start: 2020-01-01 | End: 2020-01-01 | Stop reason: HOSPADM

## 2020-01-01 RX ORDER — NOREPINEPHRINE BITARTRATE 0.06 MG/ML
INJECTION, SOLUTION INTRAVENOUS
Status: DISPENSED
Start: 2020-01-01 | End: 2020-01-01

## 2020-01-01 RX ORDER — HEPARIN SODIUM 1000 [USP'U]/ML
INJECTION, SOLUTION INTRAVENOUS; SUBCUTANEOUS
Status: DISCONTINUED | OUTPATIENT
Start: 2020-01-01 | End: 2020-01-01 | Stop reason: HOSPADM

## 2020-01-01 RX ORDER — HEPARIN SODIUM 200 [USP'U]/100ML
3 INJECTION, SOLUTION INTRAVENOUS CONTINUOUS
Status: DISCONTINUED | OUTPATIENT
Start: 2020-01-01 | End: 2020-01-01

## 2020-01-01 RX ORDER — HEPARIN SODIUM 10000 [USP'U]/100ML
500 INJECTION, SOLUTION INTRAVENOUS CONTINUOUS
Status: DISCONTINUED | OUTPATIENT
Start: 2020-01-01 | End: 2020-01-01

## 2020-01-01 RX ORDER — EPINEPHRINE IN SOD CHLOR,ISO 1 MG/10 ML
SYRINGE (ML) INTRAVENOUS
Status: DISCONTINUED | OUTPATIENT
Start: 2020-01-01 | End: 2020-01-01 | Stop reason: HOSPADM

## 2020-01-01 RX ORDER — LIDOCAINE HYDROCHLORIDE 10 MG/ML
1-30 INJECTION, SOLUTION EPIDURAL; INFILTRATION; INTRACAUDAL; PERINEURAL
Status: COMPLETED | OUTPATIENT
Start: 2020-01-01 | End: 2020-01-01

## 2020-01-01 RX ORDER — GLYCOPYRROLATE 0.2 MG/ML
0.2 INJECTION, SOLUTION INTRAMUSCULAR; INTRAVENOUS ONCE
Status: DISCONTINUED | OUTPATIENT
Start: 2020-01-01 | End: 2020-01-01 | Stop reason: HOSPADM

## 2020-01-01 RX ORDER — DEXTROSE MONOHYDRATE 100 MG/ML
INJECTION, SOLUTION INTRAVENOUS CONTINUOUS PRN
Status: DISCONTINUED | OUTPATIENT
Start: 2020-01-01 | End: 2020-01-01 | Stop reason: HOSPADM

## 2020-01-01 RX ORDER — DEXTROSE MONOHYDRATE 25 G/50ML
25-50 INJECTION, SOLUTION INTRAVENOUS
Status: DISCONTINUED | OUTPATIENT
Start: 2020-01-01 | End: 2020-01-01 | Stop reason: HOSPADM

## 2020-01-01 RX ORDER — MAGNESIUM SULFATE HEPTAHYDRATE 40 MG/ML
4 INJECTION, SOLUTION INTRAVENOUS EVERY 4 HOURS PRN
Status: DISCONTINUED | OUTPATIENT
Start: 2020-01-01 | End: 2020-01-01 | Stop reason: HOSPADM

## 2020-01-01 RX ORDER — ASPIRIN 81 MG/1
TABLET, CHEWABLE ORAL
Status: DISCONTINUED | OUTPATIENT
Start: 2020-01-01 | End: 2020-01-01 | Stop reason: HOSPADM

## 2020-01-01 RX ORDER — ARGATROBAN 1 MG/ML
150 INJECTION, SOLUTION INTRAVENOUS
Status: DISCONTINUED | OUTPATIENT
Start: 2020-01-01 | End: 2020-01-01 | Stop reason: HOSPADM

## 2020-01-01 RX ORDER — NOREPINEPHRINE BITARTRATE 0.06 MG/ML
0.03-0.4 INJECTION, SOLUTION INTRAVENOUS CONTINUOUS
Status: DISCONTINUED | OUTPATIENT
Start: 2020-01-01 | End: 2020-01-01 | Stop reason: HOSPADM

## 2020-01-01 RX ORDER — POTASSIUM CHLORIDE 29.8 MG/ML
20 INJECTION INTRAVENOUS
Status: DISCONTINUED | OUTPATIENT
Start: 2020-01-01 | End: 2020-01-01 | Stop reason: HOSPADM

## 2020-01-01 RX ORDER — ARGATROBAN 1 MG/ML
350 INJECTION, SOLUTION INTRAVENOUS
Status: DISCONTINUED | OUTPATIENT
Start: 2020-01-01 | End: 2020-01-01 | Stop reason: HOSPADM

## 2020-01-01 RX ORDER — MAGNESIUM SULFATE HEPTAHYDRATE 40 MG/ML
2 INJECTION, SOLUTION INTRAVENOUS DAILY PRN
Status: DISCONTINUED | OUTPATIENT
Start: 2020-01-01 | End: 2020-01-01 | Stop reason: HOSPADM

## 2020-01-01 RX ORDER — HEPARIN SODIUM 200 [USP'U]/100ML
3 INJECTION, SOLUTION INTRAVENOUS CONTINUOUS
Status: DISCONTINUED | OUTPATIENT
Start: 2020-01-01 | End: 2020-01-01 | Stop reason: HOSPADM

## 2020-01-01 RX ORDER — LIDOCAINE HYDROCHLORIDE ANHYDROUS AND DEXTROSE MONOHYDRATE .8; 5 G/100ML; G/100ML
1-4 INJECTION, SOLUTION INTRAVENOUS CONTINUOUS
Status: DISCONTINUED | OUTPATIENT
Start: 2020-01-01 | End: 2020-01-01 | Stop reason: HOSPADM

## 2020-01-01 RX ORDER — NOREPINEPHRINE BITARTRATE 0.06 MG/ML
.03-.4 INJECTION, SOLUTION INTRAVENOUS CONTINUOUS PRN
Status: DISCONTINUED | OUTPATIENT
Start: 2020-01-01 | End: 2020-01-01

## 2020-01-01 RX ADMIN — HEPARIN SODIUM 500 UNITS/HR: 10000 INJECTION, SOLUTION INTRAVENOUS at 02:25

## 2020-01-01 RX ADMIN — PIPERACILLIN AND TAZOBACTAM 3.38 G: 3; .375 INJECTION, POWDER, LYOPHILIZED, FOR SOLUTION INTRAVENOUS at 05:20

## 2020-01-01 RX ADMIN — HUMAN INSULIN 15 UNITS/HR: 100 INJECTION, SOLUTION SUBCUTANEOUS at 12:04

## 2020-01-01 RX ADMIN — CALCIUM GLUCONATE 1 G: 98 INJECTION, SOLUTION INTRAVENOUS at 23:38

## 2020-01-01 RX ADMIN — EPINEPHRINE 0.12 MCG/KG/MIN: 1 INJECTION PARENTERAL at 00:34

## 2020-01-01 RX ADMIN — PANTOPRAZOLE SODIUM 40 MG: 40 INJECTION, POWDER, FOR SOLUTION INTRAVENOUS at 08:30

## 2020-01-01 RX ADMIN — MIDAZOLAM (PF) 1 MG/ML IN 0.9 % SODIUM CHLORIDE INTRAVENOUS SOLUTION 5 MG/HR: at 08:29

## 2020-01-01 RX ADMIN — HUMAN INSULIN 10 UNITS/HR: 100 INJECTION, SOLUTION SUBCUTANEOUS at 05:27

## 2020-01-01 RX ADMIN — Medication: at 08:41

## 2020-01-01 RX ADMIN — HEPARIN SODIUM 1 BAG: 200 INJECTION, SOLUTION INTRAVENOUS at 23:00

## 2020-01-01 RX ADMIN — Medication 50 MEQ: at 03:46

## 2020-01-01 RX ADMIN — Medication 100 MCG/HR: at 04:03

## 2020-01-01 RX ADMIN — SODIUM CHLORIDE: 9 INJECTION, SOLUTION INTRAVENOUS at 02:24

## 2020-01-01 RX ADMIN — EPINEPHRINE 0.04 MCG/KG/MIN: 1 INJECTION PARENTERAL at 13:22

## 2020-01-01 RX ADMIN — CALCIUM GLUCONATE 1 G: 98 INJECTION, SOLUTION INTRAVENOUS at 06:07

## 2020-01-01 RX ADMIN — MIDAZOLAM (PF) 1 MG/ML IN 0.9 % SODIUM CHLORIDE INTRAVENOUS SOLUTION 5 MG/HR: at 21:30

## 2020-01-01 RX ADMIN — IODIXANOL 200 ML: 320 INJECTION, SOLUTION INTRAVASCULAR at 01:45

## 2020-01-01 RX ADMIN — SODIUM BICARBONATE: 84 INJECTION, SOLUTION INTRAVENOUS at 06:19

## 2020-01-01 RX ADMIN — LIDOCAINE HYDROCHLORIDE 8 ML: 10 INJECTION, SOLUTION EPIDURAL; INFILTRATION; INTRACAUDAL; PERINEURAL at 22:30

## 2020-01-01 RX ADMIN — SODIUM BICARBONATE 100 MEQ: 84 INJECTION, SOLUTION INTRAVENOUS at 23:14

## 2020-01-01 RX ADMIN — ALTEPLASE 1 MG/HR: 2.2 INJECTION, POWDER, LYOPHILIZED, FOR SOLUTION INTRAVENOUS at 02:24

## 2020-01-01 RX ADMIN — SODIUM CHLORIDE: 9 INJECTION, SOLUTION INTRAVENOUS at 02:23

## 2020-01-01 RX ADMIN — SODIUM BICARBONATE 50 MEQ: 84 INJECTION, SOLUTION INTRAVENOUS at 03:46

## 2020-01-01 RX ADMIN — VANCOMYCIN HYDROCHLORIDE 2500 MG: 1 INJECTION, POWDER, LYOPHILIZED, FOR SOLUTION INTRAVENOUS at 08:04

## 2020-01-01 RX ADMIN — HEPARIN SODIUM 3 ML/HR: 200 INJECTION, SOLUTION INTRAVENOUS at 05:19

## 2020-01-01 RX ADMIN — HEPARIN SODIUM 3000 UNITS: 1000 INJECTION INTRAVENOUS; SUBCUTANEOUS at 00:56

## 2020-01-01 RX ADMIN — EPINEPHRINE 0.12 MCG/KG/MIN: 1 INJECTION PARENTERAL at 05:09

## 2020-01-01 RX ADMIN — Medication 0.2 MCG/KG/MIN: at 19:19

## 2020-01-01 RX ADMIN — HUMAN INSULIN 15 UNITS/HR: 100 INJECTION, SOLUTION SUBCUTANEOUS at 08:30

## 2020-01-01 RX ADMIN — SODIUM BICARBONATE 50 MEQ: 84 INJECTION, SOLUTION INTRAVENOUS at 03:48

## 2020-01-01 ASSESSMENT — ACTIVITIES OF DAILY LIVING (ADL)
ADLS_ACUITY_SCORE: 19
ADLS_ACUITY_SCORE: 18
ADLS_ACUITY_SCORE: 18
ADLS_ACUITY_SCORE: 22

## 2020-01-01 ASSESSMENT — MIFFLIN-ST. JEOR: SCORE: 2111.5

## 2020-06-19 PROBLEM — I46.9 CARDIAC ARREST (H): Status: ACTIVE | Noted: 2020-01-01

## 2020-06-19 NOTE — Clinical Note
The first balloon was inserted into the circumflex and middle circumflex.Max pressure = 18 krupa. Total duration = 10 seconds.

## 2020-06-19 NOTE — Clinical Note
IABP inserted in the left femoral artery. IABP inserted with 50 cc balloon volume Lot number is: [id number]

## 2020-06-19 NOTE — Clinical Note
The first balloon was inserted into the circumflex and distal circumflex.Max pressure = 12 krupa. Total duration = 12 seconds.     2.0 X 12 Emerge.

## 2020-06-19 NOTE — Clinical Note
Stent deployed in the posterior descending artery. Max pressure = 14 krupa. Total duration = 10 seconds.

## 2020-06-20 NOTE — DISCHARGE SUMMARY
[facility] Discharge Summary    Madan Esteban MRN# [id number]   Age: 50 year old YOB: 1970     Date of Admission:  6/19/2020  Date of Discharge::  No discharge date for patient encounter.  Admitting Physician:  Alejo Maddox MD  Discharge Physician:  Suzie Alvarez MD           Discharge Diagnosis:   1) PEA arrest  2) ICH              Procedures:   VA ECMO placement  ECOS Catheter          Discharge Medications:     There are no discharge medications for this patient.             Brief History of Illness:     Mr. Madan Esteban is a 50 year old male with a unknown past medical history who was admitted for PEA arrest 2/2 to PE, admitted for placement of VA ECMO and catheter guided lytics               Hospital Course:     Mr. Madan Esteban is a 50 year old male with a unknown past medical history who presented to [facility] with a two day history of shortness of breath. While he was awaiting a CT scan, he became bradycardic and eventually had a witnessed PEA arrest. Patient was given multiple round of epinephrine; however, ROSC was not obtained. Given concern for PE, patient was given 50 mg of Alteplase; however, he remained in asystole. The rhythm deteriorated to asystole, and the patient was transferred to [facility] for VA-ECMO placement.      On arrival to the cardiac catherization lab, initial lactate was 18.0 and initial ABG was 6.88/33/131. Patient underwent VA-ECMO placement with 17 Welsh arterial cannula and 25 Welsh venous cannula. Coronary angiogram was performed and showed thrombotic occlusion of the distal LCx. Patient underwent PCI with good result. Afterwards, pulmonary angiogram was performed and showed massive bilateral pulmonary emboli. Given that this was likely the etiology of his cardiac arrest, patient was then transferred to  for embolectomy and EKOS catheter placement.     Shortly found to have CT Head which revealed ICH and herniation. NSGY and Neurology  were both consulted, felt that the patient had significant ICH and heniation which did not portend a good Neurological recovery. Also agreed that he would likely not recover. On 6/20, care was withdrawn on patient and patient passed on 14:37. GOL was called. Family declined autopsy                   Discharge Instructions and Follow-Up:   Discharge diet: NPO   Discharge activity: Patient passed away    Discharge follow-up: Patient passed away            Discharge Disposition:   Patient passed away in hospital      MD Binh Bennett MD  Interventional Cardiology  Pager: [phone]

## 2020-06-20 NOTE — PROGRESS NOTES
Patient Name: Madan Esteban  Medical Record Number: [id number]  Today's Date: 6/19/2020    Procedure: Bilateral Pulmonary Angiogram with pulmonary embolism thrombectomy  Proceduralist: Dr. Meryl Rodriguez and Dr. David Miranda    Procedure Start: 09:45 pm  Procedure end: 03:15 am  Sedation medications administered: Midazolam gtt @ 5 mg/hr    Report given to: TIFFANY Ramirez 4E  : n/a    Other Notes: Pt arrived to IR room #4 from Cardiac Cath Lab. Consent reviewed. Pt denies any questions or concerns regarding procedure. Pt positioned supine and monitored per protocol. Pulmonary artery thrombectomy completed. Catheter-directed thrombolytics started at end of procedure with EKOS device. Pt tolerated procedure without any noted complications. Pt transferred back to Unit 4E post-procedure.

## 2020-06-20 NOTE — H&P
[facility]  Cardiac ICU H&P  June 19, 2020          H&P:   Mr. Madan Esteban is a 50 year old male with a unknown past medical history who presented to [facility] with a two day history of shortness of breath. While he was awaiting a CT scan, he became bradycardic and eventually had a witnessed PEA arrest. Patient was given multiple round of epinephrine; however, ROSC was not obtained. Given concern for PE, patient was given 50 mg of Alteplase; however, he remained in asystole. The rhythm deteriorated to asystole, and the patient was transferred to [facility] for VA-ECMO placement.     On arrival to the cardiac catherization lab, initial lactate was 18.0 and initial ABG was 6.88/33/131. Patient underwent VA-ECMO placement with 17 Cambodian arterial cannula and 25 Cambodian venous cannula. Coronary angiogram was performed and showed thrombotic occlusion of the distal LCx. Patient underwent PCI with good result. Afterwards, pulmonary angiogram was performed and showed massive bilateral pulmonary emboli. Given that this was likely the etiology of his cardiac arrest, patient was then transferred to  for embolectomy and EKOS catheter placement.          Medications:   I have reviewed this patient's current medications    No past medical history on file.    No family history on file.  Social History     Socioeconomic History     Marital status: Not on file     Spouse name: Not on file     Number of children: Not on file     Years of education: Not on file     Highest education level: Not on file   Occupational History     Not on file   Social Needs     Financial resource strain: Not on file     Food insecurity     Worry: Not on file     Inability: Not on file     Transportation needs     Medical: Not on file     Non-medical: Not on file   Tobacco Use     Smoking status: Not on file   Substance and Sexual Activity     Alcohol use: Not on file     Drug use: Not on file     Sexual activity: Not on file  "  Lifestyle     Physical activity     Days per week: Not on file     Minutes per session: Not on file     Stress: Not on file   Relationships     Social connections     Talks on phone: Not on file     Gets together: Not on file     Attends Church service: Not on file     Active member of club or organization: Not on file     Attends meetings of clubs or organizations: Not on file     Relationship status: Not on file     Intimate partner violence     Fear of current or ex partner: Not on file     Emotionally abused: Not on file     Physically abused: Not on file     Forced sexual activity: Not on file   Other Topics Concern     Not on file   Social History Narrative     Not on file            Review of Systems:   Unable to answer given patient's clinical status             Physical Exam:   Ht 1.93 m (6' 4\")   Wt 150 kg (330 lb 11 oz)   BMI 40.25 kg/m    Vital signs:                    Height: 193 cm (6' 4\") Weight: 150 kg (330 lb 11 oz)  Estimated body mass index is 40.25 kg/m  as calculated from the following:    Height as of this encounter: 1.93 m (6' 4\").    Weight as of this encounter: 150 kg (330 lb 11 oz).      Gen: Intubated and Sedated   HEENT: NC/AT   CV: Regular Rate   Pulm: Coarse breath sounds   GI: distended but soft  Ext: ECMO and EKOS catheters in place             Data:   All lab results reviewed    Recent Results (from the past 24 hour(s))   Cardiac Catheterization    Narrative    1.  Successful VA ECMO placement with a 17 Congolese cannula in the right   femoral artery and a 25 Congolese cannula in the right femoral vein.  2.  Successful placement of an 8 Congolese distal perfusion catheter in the   right superficial femoral artery.  3.  Multivessel CAD as described below, with culprit lesion a complete   thrombotic occlusion of the distal LCx at the bifurcation of a   moderate-sized distal obtuse marginal.  4.  Successful PCI of the distal LCx/OM bifurcation with resolute [place] KIARA   x2 from the mid LCx " into the OM (2.5 x 26 mm proximally postdilated up to   3.2 mm, and 2.25 x 15 mm distally), and a T stent with a 2.5 x 12 mm   resolute [place] KIARA from the bifurcation into the continuation distal LCx.  5.  Pulmonary angiography reveals massive embolic burden bilaterally.  6.  Successful placement of a 50 cc IABP in the left femoral artery.  7.  Successful placement of a 6 Latvian, 25 cm sheath in the left femoral   vein, sutured in place to be exchanged for pulmonary embolectomy via   interventional radiology.  8.  Successful placement of a triple-lumen CVC in the right internal   jugular vein.  9.  Successful successful placement of a 4 Latvian arterial line in the   right radial artery.              Assessment and Plan:   Mr. Madan Esteban is a 50 year old male with a unknown past medical history who presented to [facility] with a two day history of shortness of breath. He had a witnessed PEA arrest (in hospital) with 90 minutes of resuscitation without intermittent ROSC despite systemic TPA. Patient was placed on VA-ECMO and has found to have bilateral PE's and a thrombotic occlusion of his LCx. He underwent PCI of his LCx and thrombectomy/EKOS catheter placement for his pulmonary embolisms. His course has been complicated by intraventricular hemorrhage, diffuse cerebral edema, and brain herniation.     #Intracranial Hemorrhage with Diffuse Cerebral Edema and Herniation: This is likely a consequence of ischemic brain injury and blood thinners. His pupils are fixed and dilated on examination. No intervention at this point. This portends a poor prognosis and it is unlikely patient will recover any meaningful neurological recovery.     #Cardiac Arrest: The etiology is likely due to his bilateral pulmonary emboli. Unclear why a patient in his 40's would develop massive PE's. Given cardiac arrest, will plan for therapeutic hypothermia for 24 hours at 34 degrees Celsius. However, we will need to discuss further  measures in the AM due to his brain condition.     #Cardiogenic Shock: This is also due to his bilateral pulmonary emboli. Continue support with VA-ECMO and IABP.     #Bilateral Pulmonary Embolism: Patient underwent EKOS catheter placement; however, TPA was stopped due to brain bleeding.     #Coronary Artery Disease: Patient had a thrombotic occlusion of his LCx. Will hold ASA/Brillenta at this time given brain bleeding. This will need to be discussed in the AM given recent PCI to dominant Cx.     #Aspiration Pneumonitis: Vancomycin/Zosyn for 5 days     #Lactic Acidosis: This is related to cardiac arrest and high pressor requirement. Will continue to trend.     Neurology: Intubated, sedated, paralyzed. Cooled to 34 degrees.  --continue versed, fentanyl, and cis as needed to maintain paralysis - RASS goal -4 to -5    Cardiovascular / Hemodynamics: Refractory PEA arrest.  KIARA to LCx.  Peripheral V-A ECMO inserted for hemodynamic support. LA 18.   TTE: Pending   EKG: Pending   --wean pressors/inotropes as able  --echo tomorrow with ECMO turndown  --continue ASA 81mg and ticagrelor 90mg BID  --hold temp at 34 for 24 hours   --ACT goal 180-200  --hold lipitor for now given likely hepatic injury during arrest  --hold ACE/ARB for now given likely reduced renal fxn after arrest  --holding beta blocker given shock      Pulmonary: --wean vent as able  --daily CXR  --Q2h ABGs for now  --consider scheduled duonebs if signs of lung dz, currently PRN     GI and Nutrition: No known medical hx.    --monitor BID LFTs  --NPO while cooled - nutrition consult pending feeding tube placement once he is warmed   --bowel regimen - on hold for now due to hypothermia  --GI Prophylaxis: PPI    Renal, Fluid and Electrolytes: --monitor urine output  --maintain K>3 and Mg>2    Infectious Disease: No signs of infection. Leukocytosis c/w arrest. Blood cultures collected.   --vancomycin/zosyn x5 days for ECMO  --daily blood cultures  --monitor for  signs of infection given cooling, lines, and leukocytosis   Hematology and Oncology: Receiving heparin for ECMO and ASA/ticagrelor for KIARA.   --cryo PRN fibrinogen < 200; FFP for INR >2  --Transfuse for Hgb<8  --heparin gtt for ECMO with ACT goal 180-200 and Xa of 0.3 to 0.7.   --US LE w/ arterial duplex per ECMO protocol   --DVT PPX: Heparin as above   Endocrinology: No known medical history. BG elevated.  --insulin gtt  --f/u HgbA1c                Pat Patino PGY-6  Cardiology Fellow   Pager: [phone]      June 19, 2020        ATTENDING ATTESTATION:  Madan Esteban is a 50 year old male admitted 6/19/2020 s/p PEA cardiac arrest thought to be 2/2 massive PE at [facility].  He was given lytics prior to transfer.  He had prolonged resuscitation prior to transfer; however, LA was 18 and paO2 was 131 leading to initiation of VA ECMO upon arrival.  Coronary angiogram showed thrombotic occlusion of the distal LCx - PCI was performed.  He then went to IR for embolectomy.  I personally examined and evaluated the patient today. The care plan was developed with the house staff team and I agree with the findings and plan in this note aside from any exceptions noted below.  I have reviewed and evaluated the vital signs, medications, laboratory values, imaging studies, and consults from the past 24 hours.     Active problems and key treatments for today include:  --Hypoxic brain injury/Intracranial hemorrhage: neurocrit consult, versed and fentanyl for now, therapeutic hypothermia, continuous EEG, head CT shows large intraventricular hemorrhage and large volume SA hemorrhage with herniation  --Cardiogenic shock/refractory PEA: VA ECMO and IABP, likely due to massive PE, but also thrombotic LCx; ASA and ticagrelor ongoing for revascularized LCx  --Pulmonary Embolism: Embolectomy performed by IR; EKOS catheter placed but lytics discontinued due to ICH  --Vasoplegic shock: wean pressors to MAP > 65, currently norepi,  epi, and vaso  --Hypoxic respiratory failure: intubated during VF arrest; lung protective ventilation undrway, vanc/zosyn for aspiration pna, cultures pending  --Acute kidney injury: monitor UOP and K  --Ischemic liver injury: monitor LFTs  --Nutrition: hold while cooled  --Heme/blood transfusions: ACT goal 180-200 on ECMO, DAPT as above; transfuse to Hgb 7 and plt 50k     I spent a total of 165 minutes providing critical care services excluding procedure and ECMO management time if needed.       Alejo Maddox MD, PhD  Interventional/Critical Care Cardiology  [phone]     June 19, 2020

## 2020-06-20 NOTE — PROGRESS NOTES
Brief Palliative Care Note:    We reviewed case, and discussed with CSI fellow.   It appears pt thought to have no chance for neurologic recovery per neurology consult, and so we mutually agreed to cancel consult.    We are in house, and happy to see, advise, etc if needs change.    Trae Salazar MD, [id number]  Palliative Medicine Fellow    -------------------------------    ADDENDUM:  Called by cards, and asked to provide support and medical information for family; we will plan to see.    Trae Salazar MD, [id number]  Palliative Medicine Fellow

## 2020-06-20 NOTE — PROGRESS NOTES
ECLS Cannulation Note:    Date on: 6/19/2020  Time on: 7:05 PM  Surgeon: Varsha    Arterial Cannula: 17 Fr. In the Right Femoral Artery      Venous Cannula: 25 Fr. In the Right Femoral Vein      ECMO components include CardioHelp oxygenator Lot # [id number]  Circuit Lot Number: [id number]  Console Serial Number: [id number]    Cannulation was performed in the Cath Lab, placement was verified by fluoroscopy, cannulas are in good position.  ISTAT and blood cooler are at bedside. Patient's blood type is A, negative.    Jeison Almodovar, RRT  6/20/2020 5:00 AM

## 2020-06-20 NOTE — PROGRESS NOTES
ECMO Attending Progress Note  6/20/2020    Madan Esteban is a 50 year old male who was cannulated for ECMO  due to PEA arrest due PE.      Cannulation Site:  17 Fr in the r femoral artery  25 Fr in the r femoral vein    Interval events: CEREBRAL BLEEDING NON COMPATIBLE WITH LIFE    Physical Exam:  Temp:  [93.4  F (34.1  C)-93.7  F (34.3  C)] 93.7  F (34.3  C)  Heart Rate:  [58-65] 63  Resp:  [12] 12  MAP:  [57 mmHg-289 mmHg] 68 mmHg  Arterial Line BP: (64-90)/(51-62) 81/56  FiO2 (%):  [60 %] 60 %  SpO2:  [96 %-100 %] 100 %    Intake/Output Summary (Last 24 hours) at 6/20/2020 1052  Last data filed at 6/20/2020 1000  Gross per 24 hour   Intake 2633.78 ml   Output 25 ml   Net 2608.78 ml    Ventilation Mode: CMV/AC  (Continuous Mandatory Ventilation/ Assist Control)  FiO2 (%): 60 %  Rate Set (breaths/minute): 12 breaths/min  Tidal Volume Set (mL): 500 mL  PEEP (cm H2O): 5 cmH2O  Oxygen Concentration (%): 60 %  Resp: 12       Labs:  Recent Labs   Lab 06/20/20  1028 06/20/20  0814 06/20/20  0559 06/20/20  0510   PH 7.19* 7.18* 7.17* 7.17*   PCO2 37 34* 31* 31*   PO2 177* 213* 234* 223*   HCO3 14* 13* 11* 11*   O2PER 60 60 60 60      Recent Labs   Lab 06/20/20  0327 06/19/20  2104 06/19/20 2016 06/19/20  1930   WBC 31.1*  --   --  10.3   HGB 12.2* 13.7 12.7* 12.0*     Creatinine   Date Value Ref Range Status   06/20/2020 3.88 (H) 0.66 - 1.25 mg/dL Final   06/20/2020 3.52 (H) 0.66 - 1.25 mg/dL Final   06/19/2020 Canceled, Test credited 0.66 - 1.25 mg/dL Corrected     Comment:     Unsatisfactory specimen - collected in wrong container  Notification of test cancellation was given to  Naomie of Cath Lab 06/19/2020 by MOSHE  CORRECTED ON 06/19 AT 2150: PREVIOUSLY REPORTED AS Canceled, Test credited   Unsatisfactory specimen   collected in wrong container         Arterial Cannula [place]ut: 17  Venous Cannula Location: RFV  Blood Flow (Circuit) LPM: 4.4 LPM  Gas Flow  LPM: 7 LPM  Gas FiO2   %: 80 %  ACT  (seconds): 196  seconds  Blood Temp  (degrees C): 34.1 C  Pulse Oximetry  (SpO2%): 100 %  Arterial Pressure  mmH mmHg      ECMO Issues including assessments and plan on DOS 2020:  Neuro: Sedated for mechanical ventilation and ECMO.  No acute distress.    CV: Cardiogenic shock.  Hemodynamically stable on EPI 0.07 and norepi 0.16 and vaso 2  Pulm: Keep vent settings at rest settings as above.  FEN/Renal: Electrolytes stable w/ replacement protocols in place, Cr stable, UOP stable  Heme: ACT goal: 180, Hemoglobin stable .  Minimal oozing around the ECMO cannulas.  ID: Receiving empiric antibiotics  Cannulae: Position is acceptable on exam and the available imaging.  Distal perfusion cannula is in place and patent.  Extremities are well-perfused.     I have personally reviewed the ECMO flows, oxygenation and CO2 clearance, anticoagulation, and cannula position.  I have also personally assessed the patient's systemic response with hemodynamics, oxygenation, ventilation, and bleeding.       I have seen and examined the patient with the CSI team. Patient's care is futile with severe cerebral injury and bleeding non compatible with life.    Binh Serrato MD  Interventional Cardiology  Pager: [phone]

## 2020-06-20 NOTE — CONSULTS
[facility]  WO Nurse Inpatient Adult Pressure Injury Prevention Assessment: ECMO  Initial     Positioning Tolerance: Good  Expected Duration of ECMO: Unknown  Presence of Ischemia: No- cooler BLLE  Location of ischemia: None present at this time    Pressure Injury Prevention Interventions In Place:        Optifoam Dressing under ECMO cannula      Z flow Positioner under head      Pillows for repositioning      TAPs Wedge Positioners in use      Heel off-loading boots      Pillows under calves for heel suspension      Mepilex Sacral Dressing      Support Surface: Low air loss mattress    Pressure Injury Prevention Interventions Added:       None    Patient History: According to medical record: Mr. Madan Esteban is a 50 year old male with a unknown past medical history who presented to [facility] with a two day history of shortness of breath. While he was awaiting a CT scan, he became bradycardic and eventually had a witnessed PEA arrest. Patient was given multiple round of epinephrine; however, ROSC was not obtained. Given concern for PE, patient was given 50 mg of Alteplase; however, he remained in asystole. The rhythm deteriorated to asystole, and the patient was transferred to [facility] for VA-ECMO placement.      Current Diet / Nutrition:     Orders Placed This Encounter      NPO for Medical/Clinical Reasons Except for: No Exceptions      Output:       I/O last 3 completed shifts:  In: 1946.7 [I.V.:1259.7]  Out: 25 [Urine:25]  Containment: of urine/stool: Urinary Catheter    Risk Assessment:                          Labs:    Recent Labs   Lab Test 06/20/20  1028 06/20/20  0814 06/20/20  0327   ALBUMIN  --  2.4* 2.4*   HGB 10.1*  --  12.2*   INR 2.19* 2.30* 3.44*   WBC 15.4*  --  31.1*   A1C  --   --  5.9*   CRP  --  61.0* 67.0*       Focused Assessment: right Groin   Pressure Injury Present: No    Plan of Care for Positioning and Pressure Injury Prevention:     Reposition patient and head every 1-2  hours using Z flow and Prevalon Wedges     Pad ECMO groin cannula under rigid connectors with Optifoam or Soft cloth      Heel off-loading Boots at all times      Sacral Mepilex for Prevention    Education provided to: nurse  Discussed importance of:repositioning every 2 hours, their role in pressure injury prevention, dressing change frequency, head elevation <30 degrees and off-loading mattress- RN aware of all preventive measures  Discussed plan of care with Nurse  WOC Nurse follow-up plan:weekly  Nursing to notify the Provider(s) and re-consult the WOC Nurse if new skin concern.

## 2020-06-20 NOTE — PLAN OF CARE
Received pt down in CL then to IR, went to CT then up to 4E. Placed on multiple pressor gtts, VA ECMO, IABP, and EKOS catheter.  Gave 6 amps bicarb, then started gtt, 6 FFP, 1 cryo, calcium given x2.  Neuro consult at bedside, see neuro note.  Wife (Juli) was contacted overnight, will update when team rounds this morning.       Admitted/transferred from: IR  Reason for admission/transfer: further management of bilateral PEs with EKOs catheter, ECMO, IABP, multiple gtts  Patient status upon admission/transfer: critical, pupils fixed upon arrival (neurology consulted at bedside)  Interventions: initiate EKOs catheter, start Sod Bicarb gtt, fentanyl gtt, manage ECMO, replace FFP, bicarb blood products PRN, titrate gtts as appropriate  Plan: continue managing multiple assistive devices, titrate gtts as appropriate, replace blood products per orders,   2 RN skin assessment: completed by Ashley Limon RN, Lazaro More RN  Result of skin assessment and interventions/actions: blanchable redness near gluteal cleft, bruising to L inner forearm, bleeding from mouth, groins.   Height, weight, drug calc weight: done  Patient belongings: wedding ring in med safe, belongings bag with cell phone, shoes, shirt  MDRO education (if applicable):

## 2020-06-20 NOTE — CONSULTS
"[facility]  Palliative Care Consultation Note    Patient: Madan Esteban  Date of Admission:  6/19/2020    Requesting Clinician / Team: CSI  Reason for consult: Patient and family support    Recommendations:    Continue to support patient's family as you have been doing.      Patient's wife did think it would be helpful to have chaplaincy be here when his parents visited.  Would ask patient's parents if they would like this.  His wife Juli does not think would be particularly important to the patient or to her per se, and so would do this only if helpful per parents      Patient's wife just does not think he would be helpful for to visit.  We discussed this at length.  After discussing at length how we could support her if she would be here she continued to decline.  We normalized this and that many 11 family is making choices and offered to support her however she prefers      Would agree with transition to comfort focus after family visits.  Patient's wife thinks that everyone who would feasibly want to visit him today is already aware and that there are no further people that will need to be notified.  Will check in with his wife Juli ([phone]) he or his mother Khadra ([phone]) prior to discontinuing support to ensure that all relevant close relatives wish to have been able to visit      If you are looking for recommendations on transition to comfort, we would suggest the following:  Recommend initiating comfort care orders through the order set \"ICU Care for Patient at End of Live\". Through the order set, initiate the following (please note differences in medication doses/frecuency recommended below that stray from the pre-set orders in order set):  - Patient care- initiate all patient care orders, nursing preparation orders and RT orders    - prior to stopping ECMO, ventilation orders, discontinue of vasopressors:    -- glycopyrrolate 0.2mg 30-60 " min prior   --continue current fentanyl drip 100mcg/hr and give 150mcg bolus dose 15 min prior to withdrawal of cares   --midazolam 10mg  mg 15 minutes prior to extubation              -- after pre-medications are given wean vent to pressure support setting and monitor for ongoing comfort on this setting. If remains comfortable (10min) then proceed with extubation. If not comfortable use prn diazepam and fentanyl a orders to obtain comfort prior to extubation. Encourage use of bolus medications over a continuous infusion as the infusions take some time to reach peak effect.     - post-withdrawal of care comfort medications:  (WE RECOGNIZE IT IS UNCLEAR THAT DRUGS WILL CIRCULATE/ BE DELIVERED RELIABLY AFTER ECMO, AND GOOD DOSING NEEDS TO BE GIVEN PRIOR TO WITHDRAWL   --for dyspnea/pain: given he has been getting fentanyl at 100/hr would be reasonable to start with fentanyl 100-200mcg Q10 minutes prn. Nurse should have 3 syringes full and ready for immediate infusion if necessary (note-- we recogniz   -- for anxiety/ agitation: 5 -10 mg 1-2mg IV q 10 min prn. Nurse should have 3 syringes full and ready for immediate infusion if necessary.   -- for secretions: prn glycopyrrolate 0.1-0.2mg IV q 4 hours prn and 1-2 atropine drops sublingual q15min prn    - Medication adjustment/titration recs:  - If needing 3 or more prn fentanyl doses per hour for 2-3 hours, would increase continuous infusion to a rate that equals total hourly use over past 1-2 hours.  -If needing 3 or more prn lorazepam doses per hour for 2-3 hours, would start continuous infusion at a rate that equals total hourly use over past 1-2 hours.   - if only moderate benefit from max dose fentanyl or lorazepam would do 50% dose increase  - if minimal to no benefit from max dose fentanyl or lorazepam would do 100% dose increase    2. Patient/Family support: ongoing  Offer  involvment      Thank you for the opportunity to participate in the care of this  patient and family. Our team: does not plan on following further, however do not hesitate to call or re-consult if we can be of further assistance to the patient/family.     During regular M-F work hours -- if you are not sure who specifically to contact -- please contact us by sending a text page to our team consult pager at [phone].    After regular work hours and on weekends/holidays, you can call our answering service at [phone]. Also, who's on call for us is available in [facility] [employer].     Trae Salazar MD, [id number]  Palliative Medicine Fellow  Seen with Dr Art    Patient seen and evaluated with Dr. Salazar.   Agree with assessment and recommendations.    Fely Rubens  Palliative Care   Pager [phone]          Assessments:  Madan Esteban is a 50 year old male with a massive PE, cardiac arrest, and unsurvivable anoxic injury.  He is on ECMO there is plan for discontinuation later today after family has visited    Today, the patient was seen for:  Emotional support of family    Prognosis, Goals, & Planning:      Functional Status just prior to hospitalization: 0 (Fully active, able to carry on all activities without restriction)      Prognosis, Goals, and/or Advance Care Planning were addressed today: Yes        Summary/Comments:       Patient's decision making preferences: unable to assess          Patient has decision-making capacity today for complex decisions: No            I have concerns about the patient/family's health literacy today: No           Patient has a completed Health Care Directive: No.       Code status: full code  -- to be withdrawn in coming hours    Coping, Meaning, & Spirituality:   Mood, coping, and/or meaning in the context of serious illness were addressed today: Yes  Summary/Comments:     Social:     Living situation: lives with his wife ( since 2008).  No children.   Wife's son (age 25) sees [place] as his father.    Key family / caregivers:  "none    History of Present Illness:    Madan Esteban is a 50-year-old man with a history of obesity who is admitted to the hospital after a cardiac arrest.  He apparently presented to [facility] with a 2-day history of shortness of breath and while waiting a CT scan became bradycardic and had a witnessed PEA arrest.  Patient was given multiple rounds of CPR however ROSC was not obtained patient was given 50 mg of alteplase but remained in asystole patient was transferred to the [facility] for VA ECMO placement.  On arrival patient's lactate was 18, ABG 6.88.  Thrombotic occlusion was seen on his coronary angiogram and underwent PCI with reported good results.  Massive bilateral PEs were seen.  Imaging was performed and he was assessed by neurology.  Unfortunately patient was found to have nonsurvivable injuries.    Patient's wife is of course incredibly distressed by this and has a difficult time talking on the phone, however said it would be helpful to do so.  She reports this is quite a bit shock.  He was her high school sweetheart (understood correctly) and the been  since 2008.  She had no suspicion that he could be this ill.  She understands now that he is \"brain dead, and she tells me.  She had hoped to donate tissues if possible.  Per the charge nurse this is not possible due to the time he had after his arrest without ROSC.  He is parents are visiting.  His wife just does not think that this would be helpful constructive for her and voices that she wants to remember him how he was and how he currently is.    Key Palliative Symptom Data:  We are not helping to manage these symptoms currently in this patient.    Patient is on opioids: bowels not assessed today.    ROS:  Comprehensive ROS is not obtainable     Past Medical History:  No pertinent medical hx     Past Surgical History:  No pertinent family hx    Family History:  No pertinent family hx     Allergies:  Allergies not on " file     Medications:  I have reviewed this patient's medication profile and medications from this hospitalization.     Fentanyl drip 100 mcg/hr; no prns noted  Midazolam (was on 5mg/ hr until 10am, now off), no prns noted    Pressors x 2    Physical Exam:  Vital Signs: Temp: 93.9  F (34.4  C) Temp src: Bladder     Heart Rate: 62 Resp: 12 SpO2: 100 % O2 Device: Mechanical Ventilator    Weight: 330 lbs 11.04 oz  General: NICU, appears nonresponsive, lying in bed.  Multiple cannula in place  Neuro: Eyes closed.  Pupils 6 mm bilaterally nonresponsive.  No visible purposeful movements.  No reaction to voice mild to moderate pace painful stimulus to finger.  HEENT: Intubated  Cardiovascular: On ECMO Pink, no mildly cool extremities  Pulmonary: Intubated.  Equal chest rise.  Abdomen: Obese  Skin: No visible rashes    Data reviewed:  Recent imaging reviewed, my comments on pertinents:   CT head (yesterday):                                                              Impression:   1. Large intraventricular hemorrhage in the lateral, third and fourth  ventricles.  2. Subarachnoid hemorrhage within the bilateral sylvian fissures,  perimesencephalic cisterns and interhemispheric fissures.  3. Cerebellar tonsillar herniation measuring 1.2 cm.  4. Diffuse cerebral edema.  5. No definite evidence of acute infarction.    Recent lab data reviewed, my comments on pertinents:     Troponin greater than 200  LDH 3000  Lactic acid 16  Calcium ionized 3.4  AST 2000,   Creatinine 3.9  pH 7.12

## 2020-06-20 NOTE — PROGRESS NOTES
ECLS Discontinuation Note:    ECLS was discontinued 6/20/2020 @ 1434.    Te Hamilton, RRT  6/20/2020 2:46 PM

## 2020-06-20 NOTE — PROGRESS NOTES
ECMO Shift Summary:      ECMO Equipment:  Console serial number: [id number]  Circuit Lot number: [id number]  Oxygenator Lot number: [id number]    Patient remains on VA ECMO, all equipment is functioning and alarms are appropriately set. RPM's 4350 with flow range 4.8-4.9 L/min. Sweep gas is at 8 LPM and FiO2 80%. Circuit remains free of air, clot and fibrin. Cannulas are secure with a moderate amount of oozing from the site. Extremities are cool to touch as the patient is being therapeutically cooled. Suctioned ETT for a small to moderate amount of thick, red secretions.    Significant Shift Events: The patient went to IR for a pulmonary angiogram, thrombectomy, and eventual placement of EKOS lines. Numerous large clots were removed in IR. The patient went to CT after this, and the neurological exam showed cerebral hemorrhages and herniation. Heparin and other anticoagulants with the EKOS were stopped. 4 units of FFP were given in IR. 2 units of FFP and one unit of cryo were given on 4E. The patient is being cooled at 34 degrees Celsius.     Vent settings:  Resp: 12  .    Heparin is off, ACT range 238-348.    Urine output is inadequate, blood loss was a large amount from the IR procedure and a small to moderate amount from oozing at various sites. Total product overnight included 6 FFP, 1 cryoprecipitate.      Intake/Output Summary (Last 24 hours) at 6/20/2020 0632  Last data filed at 6/20/2020 0615  Gross per 24 hour   Intake 1654.7 ml   Output 25 ml   Net 1629.7 ml       ECHO:  No results found for this or any previous visit.No results found for this or any previous visit.    CXR:  Recent Results (from the past 24 hour(s))   Cardiac Catheterization    Narrative    1.  Successful VA ECMO placement with a 17 Filipino cannula in the right   femoral artery and a 25 Filipino cannula in the right femoral vein.  2.  Successful placement of an 8 Filipino distal perfusion catheter in the   right superficial femoral artery.  3.   Multivessel CAD as described below, with culprit lesion a complete   thrombotic occlusion of the distal LCx at the bifurcation of a   moderate-sized distal obtuse marginal.  4.  Successful PCI of the distal LCx/OM bifurcation with resolute [place] KIARA   x2 from the mid LCx into the OM (2.5 x 26 mm proximally postdilated up to   3.2 mm, and 2.25 x 15 mm distally), and a T stent with a 2.5 x 12 mm   resolute [place] KIARA from the bifurcation into the continuation distal LCx.  5.  Pulmonary angiography reveals massive embolic burden bilaterally.  6.  Successful placement of a 50 cc IABP in the left femoral artery.  7.  Successful placement of a 6 Japanese, 25 cm sheath in the left femoral   vein, sutured in place to be exchanged for pulmonary embolectomy via   interventional radiology.  8.  Successful placement of a triple-lumen CVC in the right internal   jugular vein.  9.  Successful successful placement of a 4 Japanese arterial line in the   right radial artery.   CT Head w/o Contrast   Result Value    Radiologist flags (Urgent)     Large intraventricular and subarachnoid hemorrhage.    Narrative    CT HEAD W/O CONTRAST 6/20/2020 3:21 AM    Provided History: Cardiac Arrest. Post tPA and heparin.  ICD-10:    Comparison: None.    Technique: Using multidetector thin collimation helical acquisition  technique, axial, coronal and sagittal CT images from the skull base  to the vertex were obtained without intravenous contrast.     Findings:    Intraventricular hemorrhage in the bilateral lateral ventricles (worse  on the left is present, third ventricle and fourth ventricle.  Subarachnoid hemorrhage in the bilateral sylvian fissures,  perimesencephalic fissures and interhemispheric fissures, worse on the  left. Residual contrast from recent cardiac catheterization. No  definite loss of gray-white matter differentiation in the cerebral  hemispheres. Diffuse cerebral sulcal effacement. Crowding of the  structures of the foramen  magnum. The cerebellar tonsils are displaced  1.2 cm below the foramen magnum. Bilateral uncal herniation.    Hyperostosis frontalis interna. Mild mucosal thickening of the  paranasal sinuses.. The mastoid air cells are clear.       Impression    Impression:   1. Large intraventricular hemorrhage in the lateral, third and fourth  ventricles.  2. Subarachnoid hemorrhage within the bilateral sylvian fissures,  perimesencephalic cisterns and interhemispheric fissures.  3. Cerebellar tonsillar herniation measuring 1.2 cm.  4. Diffuse cerebral edema.  5. No definite evidence of acute infarction.    [Urgent Result: Large intraventricular and subarachnoid hemorrhage.  Cerebellar tonsillar herniation and cerebral edema.]    Finding was identified on 6/20/2020 3:23 AM.     Dr. Patino was contacted by Dr. Hdz at 6/20/2020 3:25 AM and verbalized  understanding of the urgent finding.      CT Chest Abdomen Pelvis w/o Contrast    Narrative    EXAMINATION: CT of the chest, abdomen and pelvis on 6/20/2020.    INDICATION: Cardiac arrest     COMPARISON: None     TECHNIQUE: Axial images of the chest, abdomen and pelvis were obtained  without contrast. Coronal reconstructions were provided. Images were  reviewed in bone, lung, and soft tissue windows.    Dose: 3432 mGy*cm    FINDINGS:    Lines and tubes: Endotracheal tube tip positioned in the low thoracic  trachea. Right IJ central venous catheter terminates in the low SVC.  Right femoral arterial catheter terminating in the right common  femoral artery. Right femoral venous ECMO cannulae terminating at the  inferior cavoatrial junction. Left femoral artery approach  intra-aortic balloon pump superior marker is positioned in the  superior descending aorta. The inferior IABP marker is positioned  immediately superior to the renal arteries. Left femoral vein approach  EKOS catheter. Gastric tube terminating in the stomach. Avila  catheter.    Chest:  Small bilateral pleural effusions with  adjacent atelectasis. Small  nodular consolidation in the bilateral lower lungs. Diffuse patchy  groundglass opacities. Mild cardiomegaly. Trace pericardial effusion.  No pneumothorax. Heterogeneous attenuation of the bilateral kidneys.  Nonspecific surrounding fat stranding. Multiple renal cysts  bilaterally. The bilateral adrenal glands are unremarkable. No bowel  dilation or wall thickening. The bladder is decompressed. No  intra-abdominal free air. Retroperitoneal fat stranding bilaterally.    Abdomen and Pelvis:   The liver and spleen are unremarkable. Hyperattenuation and wall  thickening of the gallbladder. Diffuse fatty infiltration of the  pancreas.    Vessels and lymph nodes:  Intravascular devices as above. No mediastinal, hilar or axillary  lymphadenopathy. No lymphadenopathy in the abdomen or pelvis.    Bones and soft tissues:  Degenerative changes of the spine. No suspicious osseous lesions.      Impression    IMPRESSION:   1. Lines and tubes outlined above.  2. Bilateral ground glass and consolidative opacities may represent  pulmonary edema, hemorrhage or infection.  3. Small bilateral pleural effusions.   XR Chest Port 1 View    Narrative    XR chest portable one view on 6/20/2020 4:26 AM.    INDICATION: Check endotracheal tube placement and ECLS cannula  placement.    COMPARISON: Same-day CT    FINDINGS:   Portable AP supine radiograph of the chest. Endotracheal tube tip  terminates within the mid thoracic trachea. Right IJ central venous  catheter tip projects over the low SVC. Intra-aortic balloon pump  marker projects at the level of the talha. Enteric tube is partially  visualized with sidehole projecting over the stomach. EKOS catheters  in the bilateral pulmonary arteries. Cardiac silhouette is mildly  enlarged. No pneumothorax. Small bilateral pleural effusions. Low lung  volumes. Diffuse interstitial and airspace opacities. The visualized  upper abdomen is unremarkable. Degenerative  changes of the spine.      Impression    IMPRESSION:   1. Endotracheal tube tip projects over the midthoracic trachea.  2. Inferior approach venous ECMO cannula tip projects in the high IVC.  3. Intra-aortic balloon pump marker projects at the level of the  talha.  4. EKOS catheters.   4. Low lung volumes. Diffuse interstitial and airspace opacities may  represent edema, hemorrhage or infection.   XR Abdomen Port 1 View    Narrative    XR abdomen portable one view on 6/20/2020 4:29 AM.    INDICATION: Verify OG placement.    COMPARISON: Same day CT    FINDINGS:   Portable AP supine radiograph of the abdomen. Enteric tube tip and  sidehole project over the stomach. ECMO cannula. EKOS catheter line.  Nonobstructed bowel gas pattern. No pneumatosis or portal venous gas.      Impression    IMPRESSION:   Enteric tube tip and sidehole project over the stomach.       Labs:  Recent Labs   Lab 06/20/20  0559 06/20/20  0510 06/20/20  0327 06/19/20  2104   PH 7.17* 7.17* 7.12* 7.05*   PCO2 31* 31* 33* 38   PO2 234* 223* 259* 251*   HCO3 11* 11* 11* 11*   O2PER 60 60 60 100.0       Lab Results   Component Value Date    HGB 12.2 (L) 06/20/2020    PHGB 70 (H) 06/20/2020     06/20/2020    FIBR <61 (LL) 06/20/2020    INR 3.44 (H) 06/20/2020    PTT 95 (H) 06/20/2020    DD >20.0 (H) 06/20/2020    AXA <0.10 06/20/2020         Plan is to eventually re-warm and no definitive decision has been made to withdraw care at this time despite the severe neurological findings.      Jeison Almodovar, RT  6/20/2020 6:32 AM

## 2020-06-20 NOTE — PROGRESS NOTES
Neurosurgery note    Received a call regarding pt Eulalia. This is a 50 year old man who sustained a cardiac arrest for ~90 minutes and was then placed on VA-ECMO. CT head showed extensive anoxic brain injury with loss of gray-white differentiation as well as multiple hemorrhages in subarachnoid/ventricular spaces and brainstem. His sedation was held, but his exam remained poor. He has blown/fixed pupils, no brainstem reflexes, no movement on noxious stimuli. Discussed that this is consistent with a non-survivable injury. No surgical intervention. Recommend [facility] evaluation for brain death examination. Staffed with chief resident overnight.    -----------------------------------  Brittany Cummings MD, MS  Neurosurgery PGY-3

## 2020-06-20 NOTE — PROCEDURES
[facility] Brief Procedure Note    Pre-operative diagnosis: PEA arrest, massive bilateral PEs, requiring VA ECMO   Post-operative diagnosis Same   Procedure: 1.  Pulmonary angiogram  2.  Mechanical thrombectomy of bilateral central pulmonary arteries  3.  Placement of thrombolysis infusion catheters into bilateral pulmonary arteries with commencement of overnight TPA thrombolysis   Surgeon: Meryl Rodriguez MD   Assistants(s): David Miranda MD   Estimated blood loss: 750 cc        Findings: Large saddle embolus and extensive bilateral central pulmonary emboli with little flow into the right and left lung on initial angiogram.  Via 24 Venezuelan left groin sheath, Inari FlowTriever mechanical thrombectomy,device, large amounts of thrombus were removed from the right central pulmonary arteries, a lesser degree from the left central pulmonary arteries, as well as a large central saddle pulmonary embolus.  Some peripheral emboli persist on the right, and some central and peripheral emboli persist on the left.  Therefore ultrasound-assisted catheter directed thrombolysis has been commenced into the bilateral pulmonary arteries.      TPA infusion protocol is 1 mg/h through each catheter for 12 hours (total of 24 mg) then cease thrombolysis; once thrombolysis has been ceased, switch to saline infusions through the infusion catheters so that they do not thrombose.  Low-dose heparin at 500 cc/h has been commenced through the left groin sheath. IR will remove thrombolysis infusion catheters and sheath tomorrow afternoon.     Complications: None.

## 2020-06-20 NOTE — PROGRESS NOTES
S:  Pt's CT overnight showed intracranial hemorrhage with bilateral uncal herniation. Pt's pupils 5-6+ fixed and dilated.  Family notified of CT findings and decision made to withdraw support. Pt's family into see pt, pt's belongings taken home by pt's mother including phone and wedding band.  Pt's TOD 14:37pm today. [employer] notified-family expressed wish to donate. Waiting to hear about possibility for eye and tissue donation, pt not a candidate for organ donation.  Pt to be transported to [facility] to await  home arrangements. Pt's spouse Juli given [employer] security phone # to make arrangements for transfer to  home when family has made decision.

## 2020-06-20 NOTE — PHARMACY-VANCOMYCIN DOSING SERVICE
Pharmacy Vancomycin Initial Note  Date of Service 2020  Patient's  1899  89 year old, male    Indication: Aspiration Pneumonia    Current estimated CrCl = Estimated Creatinine Clearance: 35 mL/min (based on SCr of 3.52 mg/dL and AdjBW)    Creatinine for last 3 days  2020:  3:27 AM Creatinine 3.52 mg/dL    Recent Vancomycin Level(s) for last 3 days  No results found for requested labs within last 72 hours.      Vancomycin IV Administrations (past 72 hours)      No vancomycin orders with administrations in past 72 hours.                Nephrotoxins and other renal medications (From now, onward)    Start     Dose/Rate Route Frequency Ordered Stop    20 0600  vancomycin (VANCOCIN) 2,500 mg in sodium chloride 0.9 % 250 mL intermittent infusion      2,500 mg (central catheter)  over 60 Minutes Intravenous ONCE 20 0406      20 0530  piperacillin-tazobactam (ZOSYN) 2.25 g vial to attach to  ml bag      2.25 g  over 30 Minutes Intravenous EVERY 6 HOURS 20 0529    20 0424  vancomycin place [facility] - receiving intermittent dosing      1 each Intravenous SEE ADMIN INSTRUCTIONS 20 04220 040  phenylephrine (AUGUST-SYNEPHRINE) 50 mg in sodium chloride 0.9 % 250 mL infusion      0.5-6 mcg/kg/min × 150 kg (Dosing Weight)  22.5-270 mL/hr  Intravenous CONTINUOUS 20 0400  norepinephrine (LEVOPHED) 16 mg in  mL infusion      0.03-0.4 mcg/kg/min × 150 kg (Dosing Weight)  4.2-56.3 mL/hr  Intravenous CONTINUOUS 20 0339      20  vasopressin (VASOSTRICT) 40 Units in sodium chloride 0.9 % 40 mL infusion      0.5-4 Units/hr  0.5-4 mL/hr  Intravenous CONTINUOUS 20            Contrast Orders - past 72 hours (72h ago, onward)    Start     Dose/Rate Route Frequency Ordered Stop    20  iopamidol (ISOVUE-370) solution  Status:  Discontinued        ONCE PRN 20 0316         Plan:  1.  Start vancomycin 2500 mg IV daily    2.  Goal Trough Level: 15-20 mg/L   3.  Pharmacy will check trough levels as appropriate in 1-3 Days.    4. Serum creatinine levels will be ordered daily for the first week of therapy and at least twice weekly for subsequent weeks.    5. Claridge method utilized to dose vancomycin therapy: Method 2    Jennifer Rodriguez, PharmD, BCPS

## 2020-06-20 NOTE — CONSULTS
"Neuroscience Intensive Care Consult     Assessment and Recommendation:  Mr. Esteban is a 50 y.o. man with unknown PMH who presented to [facility] with dyspnea x 2 days while there he had PEA arrest without ROSC and remained in asystole who was transferred to [facility] for VA-ECMO. He received alteplase 50 mg at [facility] with concern for PE. Notable acidotic on arrival s/p VA-ECMO with PCI to distal LCx. CT chest showed massive bilateral PE. Underwent brain CT head that shows diffuse cerebral edema, massive IVH and brainstem herniation. Prognosis is very poor given CT head findings which are incompatible with life and exam with mid-dilated, fixed pupils with no brainstem reflexes.     I discussed these image results with both fellow, Dr. Haq and attending, Dr. Quintanilla who also reviewed imaging.     Zenaida Schneider MD   Neurology PGY 3  [phone]     HPI  Mr. Esteban is a 50 y.o. man with unknown PMH who presented to [facility] with dyspnea x 2 days while there he had PEA arrest without ROSC and remained in asystole who was transferred to [facility] for VA-ECMO. He received alteplase 50 mg at [facility] with concern for PE. Notable acidotic on arrival s/p VA-ECMO with PCI to distal LCx. CT chest showed massive bilateral PE. Underwent brain CT head that shows diffuse cerebral edema, massive IVH and brainstem herniation.      Past Medical/Surgery History: No past medical history on file.  Family History: No family history on file.  Social History:  Unknown   ROS: Unattainable.     Vital Signs:  Vital signs:    Temp src: Bladder               Height: 193 cm (6' 4\") Weight: 150 kg (330 lb 11 oz)  Estimated body mass index is 40.25 kg/m  as calculated from the following:    Height as of this encounter: 1.93 m (6' 4\").    Weight as of this encounter: 150 kg (330 lb 11 oz).    Physical Examination:  Neuro:  MS: Sedated midazolam 5, does not respond to voice or sternal rub.   CN: Pupils mid-position, dilated 5-6 mm, fixed. " Absent oculocephalic, gag, cough, and corneal reflex. Not overbreathing the vent.   Sensation: Does not respond to noxious.     Labs/Studies:  Arterial pH 7.12 with lactate 18.0     Imaging:  Initial Head CT 06/20/20  Impression:   1. Large intraventricular hemorrhage in the lateral, third and fourth  ventricles.  2. Subarachnoid hemorrhage within the bilateral sylvian fissures,  perimesencephalic cisterns and interhemispheric fissures.  3. Cerebellar tonsillar herniation measuring 1.2 cm.  4. Diffuse cerebral edema.  5. No definite evidence of acute infarction.     [Urgent Result: Large intraventricular and subarachnoid hemorrhage.  Cerebellar tonsillar herniation and cerebral edema.]

## 2020-06-20 NOTE — CONSULTS
"[facility]  NEUROSURGERY CONSULTATION NOTE    This consultation was requested by CVICU    Reason for Consultation  Cerebral hemorrhage    History of Present Illness:  Patient is a 50 year old man who was admitted after cardiac arrest for 90 minutes due to bilateral pulmonary emboli, started on VA-ECMO. CT head showed diffuse cerebral edema with extensive hemorrhages in the subarachnoid and ventricular spaces. His sedation was held, but his exam remained poor. Neurosurgery was consulted for evaluation.    PAST MEDICAL HISTORY: No past medical history on file.    PAST SURGICAL HISTORY: No past surgical history on file.    FAMILY HISTORY: No family history on file.    SOCIAL HISTORY:   Social History     Tobacco Use     Smoking status: Not on file   Substance Use Topics     Alcohol use: Not on file       MEDICATIONS:  No current outpatient medications on file.       Allergies:  Allergies not on file      ROS: 10 point ROS of systems including Constitutional, Eyes, Respiratory, Cardiovascular, Gastroenterology, Genitourinary, Integumentary, Muscularskeletal, Psychiatric were all negative except for pertinent positives noted in my HPI.    EXAM:  Temp 93.6  F (34.2  C)   Resp 12   Ht 1.93 m (6' 4\")   Wt 150 kg (330 lb 11 oz)   SpO2 100%   BMI 40.25 kg/m    Intubated, partially sedated  Pupils fixed and dilated  No corneals, no cough, no gag  No movement to stimuli    LABS/IMAGING:  CT head (6/20/20):   1. Large intraventricular hemorrhage in the lateral, third and fourth ventricles.  2. Subarachnoid hemorrhage within the bilateral sylvian fissures, perimesencephalic cisterns and interhemispheric fissures.  3. Cerebellar tonsillar herniation measuring 1.2 cm.  4. Diffuse cerebral edema.  5. No definite evidence of acute infarction.    ASSESSMENT:  50 M with diffuse cerebral edema , multiple hemorrhages in the subarachnoid and ventricular spaces, and cerebellar tonsilar " herniation. Exam is poor. The clinical and imaging findings point to a non-survivable brain injury.    RECOMMENDATIONS:  - No surgical intervention  - Agree with NCC evaluation  - Neurosurgery signing off. Call with questions.    The patient was discussed with Dr. Whittaker, neurosurgery chief resident, and Dr. Aguilar, neurosurgery staff.  -----------------------------------  Brittany Cummings MD, MS  Neurosurgery PGY-3

## 2020-06-20 NOTE — PROGRESS NOTES
[facility]  Cardiac ICU H&P  June 20, 2020          H&P:   Mr. Madan Esteban is a 50 year old male with a unknown past medical history who presented to [facility] with a two day history of shortness of breath. While he was awaiting a CT scan, he became bradycardic and eventually had a witnessed PEA arrest. Patient was given multiple round of epinephrine; however, ROSC was not obtained. Given concern for PE, patient was given 50 mg of Alteplase; however, he remained in asystole. The rhythm deteriorated to asystole, and the patient was transferred to [facility] for VA-ECMO placement.     S/IE  - significant ICH on CT scan  - Neuro and NSGY with no acute intervention  - Palliatative care called         Medications:   I have reviewed this patient's current medications    No past medical history on file.    No family history on file.  Social History     Socioeconomic History     Marital status: Not on file     Spouse name: Not on file     Number of children: Not on file     Years of education: Not on file     Highest education level: Not on file   Occupational History     Not on file   Social Needs     Financial resource strain: Not on file     Food insecurity     Worry: Not on file     Inability: Not on file     Transportation needs     Medical: Not on file     Non-medical: Not on file   Tobacco Use     Smoking status: Not on file   Substance and Sexual Activity     Alcohol use: Not on file     Drug use: Not on file     Sexual activity: Not on file   Lifestyle     Physical activity     Days per week: Not on file     Minutes per session: Not on file     Stress: Not on file   Relationships     Social connections     Talks on phone: Not on file     Gets together: Not on file     Attends Christianity service: Not on file     Active member of club or organization: Not on file     Attends meetings of clubs or organizations: Not on file     Relationship status: Not on file     Intimate partner violence     " Fear of current or ex partner: Not on file     Emotionally abused: Not on file     Physically abused: Not on file     Forced sexual activity: Not on file   Other Topics Concern     Not on file   Social History Narrative     Not on file            Review of Systems:   Unable to answer given patient's clinical status             Physical Exam:   Temp 93.7  F (34.3  C)   Resp 12   Ht 1.93 m (6' 4\")   Wt 150 kg (330 lb 11 oz)   SpO2 100%   BMI 40.25 kg/m    Vital signs:  Temp: 93.7  F (34.3  C) Temp src: Bladder     Heart Rate: 64 Resp: 12 SpO2: 100 % O2 Device: Mechanical Ventilator   Height: 193 cm (6' 4\") Weight: 150 kg (330 lb 11 oz)  Estimated body mass index is 40.25 kg/m  as calculated from the following:    Height as of this encounter: 1.93 m (6' 4\").    Weight as of this encounter: 150 kg (330 lb 11 oz).      Gen: Intubated and Sedated   HEENT: NC/AT   CV: Regular Rate   Pulm: Coarse breath sounds   GI: distended but soft  Ext: ECMO and EKOS catheters in place             Data:   All lab results reviewed    Recent Results (from the past 24 hour(s))   Cardiac Catheterization    Narrative    1.  Successful VA ECMO placement with a 17 Finnish cannula in the right   femoral artery and a 25 Finnish cannula in the right femoral vein.  2.  Successful placement of an 8 Finnish distal perfusion catheter in the   right superficial femoral artery.  3.  Multivessel CAD as described below, with culprit lesion a complete   thrombotic occlusion of the distal LCx at the bifurcation of a   moderate-sized distal obtuse marginal.  4.  Successful PCI of the distal LCx/OM bifurcation with resolute [place] KIARA   x2 from the mid LCx into the OM (2.5 x 26 mm proximally postdilated up to   3.2 mm, and 2.25 x 15 mm distally), and a T stent with a 2.5 x 12 mm   resolute [place] KIARA from the bifurcation into the continuation distal LCx.  5.  Pulmonary angiography reveals massive embolic burden bilaterally.  6.  Successful placement of a 50 " cc IABP in the left femoral artery.  7.  Successful placement of a 6 Cameroonian, 25 cm sheath in the left femoral   vein, sutured in place to be exchanged for pulmonary embolectomy via   interventional radiology.  8.  Successful placement of a triple-lumen CVC in the right internal   jugular vein.  9.  Successful successful placement of a 4 [place]onian arterial line in the   right radial artery.   CT Head w/o Contrast   Result Value    Radiologist flags (Urgent)     Large intraventricular and subarachnoid hemorrhage.    Narrative    CT HEAD W/O CONTRAST 6/20/2020 3:21 AM    Provided History: Cardiac Arrest. Post tPA and heparin.  ICD-10:    Comparison: None.    Technique: Using multidetector thin collimation helical acquisition  technique, axial, coronal and sagittal CT images from the skull base  to the vertex were obtained without intravenous contrast.     Findings:    Intraventricular hemorrhage in the bilateral lateral ventricles (worse  on the left is present, third ventricle and fourth ventricle.  Subarachnoid hemorrhage in the bilateral sylvian fissures,  perimesencephalic fissures and interhemispheric fissures, worse on the  left. Residual contrast from recent cardiac catheterization. No  definite loss of gray-white matter differentiation in the cerebral  hemispheres. Diffuse cerebral sulcal effacement. Crowding of the  structures of the foramen magnum. The cerebellar tonsils are displaced  1.2 cm below the foramen magnum. Bilateral uncal herniation.    Hyperostosis frontalis interna. Mild mucosal thickening of the  paranasal sinuses.. The mastoid air cells are clear.       Impression    Impression:   1. Large intraventricular hemorrhage in the lateral, third and fourth  ventricles.  2. Subarachnoid hemorrhage within the bilateral sylvian fissures,  perimesencephalic cisterns and interhemispheric fissures.  3. Cerebellar tonsillar herniation measuring 1.2 cm.  4. Diffuse cerebral edema.  5. No definite evidence of  acute infarction.    [Urgent Result: Large intraventricular and subarachnoid hemorrhage.  Cerebellar tonsillar herniation and cerebral edema.]    Finding was identified on 6/20/2020 3:23 AM.     Dr. Patino was contacted by Dr. Hdz at 6/20/2020 3:25 AM and verbalized  understanding of the urgent finding.      CT Chest Abdomen Pelvis w/o Contrast    Narrative    EXAMINATION: CT of the chest, abdomen and pelvis on 6/20/2020.    INDICATION: Cardiac arrest     COMPARISON: None     TECHNIQUE: Axial images of the chest, abdomen and pelvis were obtained  without contrast. Coronal reconstructions were provided. Images were  reviewed in bone, lung, and soft tissue windows.    Dose: 3432 mGy*cm    FINDINGS:    Lines and tubes: Endotracheal tube tip positioned in the low thoracic  trachea. Right IJ central venous catheter terminates in the low SVC.  Right femoral arterial catheter terminating in the right common  femoral artery. Right femoral venous ECMO cannulae terminating at the  inferior cavoatrial junction. Left femoral artery approach  intra-aortic balloon pump superior marker is positioned in the  superior descending aorta. The inferior IABP marker is positioned  immediately superior to the renal arteries. Left femoral vein approach  EKOS catheter. Gastric tube terminating in the stomach. Avila  catheter.    Chest:  Small bilateral pleural effusions with adjacent atelectasis. Small  nodular consolidation in the bilateral lower lungs. Diffuse patchy  groundglass opacities. Mild cardiomegaly. Trace pericardial effusion.  No pneumothorax. Heterogeneous attenuation of the bilateral kidneys.  Nonspecific surrounding fat stranding. Multiple renal cysts  bilaterally. The bilateral adrenal glands are unremarkable. No bowel  dilation or wall thickening. The bladder is decompressed. No  intra-abdominal free air. Retroperitoneal fat stranding bilaterally.    Abdomen and Pelvis:   The liver and spleen are unremarkable.  Hyperattenuation and wall  thickening of the gallbladder. Diffuse fatty infiltration of the  pancreas.    Vessels and lymph nodes:  Intravascular devices as above. No mediastinal, hilar or axillary  lymphadenopathy. No lymphadenopathy in the abdomen or pelvis.    Bones and soft tissues:  Degenerative changes of the spine. No suspicious osseous lesions.      Impression    IMPRESSION:   1. Lines and tubes outlined above.  2. Bilateral ground glass and consolidative opacities may represent  pulmonary edema, hemorrhage or infection.  3. Small bilateral pleural effusions.   XR Chest Port 1 View    Narrative    XR chest portable one view on 6/20/2020 4:26 AM.    INDICATION: Check endotracheal tube placement and ECLS cannula  placement.    COMPARISON: Same-day CT    FINDINGS:   Portable AP supine radiograph of the chest. Endotracheal tube tip  terminates within the mid thoracic trachea. Right IJ central venous  catheter tip projects over the low SVC. Intra-aortic balloon pump  marker projects at the level of the talha. Enteric tube is partially  visualized with sidehole projecting over the stomach. EKOS catheters  in the bilateral pulmonary arteries. Cardiac silhouette is mildly  enlarged. No pneumothorax. Small bilateral pleural effusions. Low lung  volumes. Diffuse interstitial and airspace opacities. The visualized  upper abdomen is unremarkable. Degenerative changes of the spine.      Impression    IMPRESSION:   1. Endotracheal tube tip projects over the midthoracic trachea.  2. Inferior approach venous ECMO cannula tip projects in the high IVC.  3. Intra-aortic balloon pump marker projects at the level of the  talha.  4. EKOS catheters.   4. Low lung volumes. Diffuse interstitial and airspace opacities may  represent edema, hemorrhage or infection.   XR Abdomen Port 1 View    Narrative    XR abdomen portable one view on 6/20/2020 4:29 AM.    INDICATION: Verify OG placement.    COMPARISON: Same day CT    FINDINGS:    Portable AP supine radiograph of the abdomen. Enteric tube tip and  sidehole project over the stomach. ECMO cannula. EKOS catheter line.  Nonobstructed bowel gas pattern. No pneumatosis or portal venous gas.      Impression    IMPRESSION:   Enteric tube tip and sidehole project over the stomach.              Assessment and Plan:   Mr. Madan Esteban is a 50 year old male with a unknown past medical history who presented to [facility] with a two day history of shortness of breath. He had a witnessed PEA arrest (in hospital) with 90 minutes of resuscitation without intermittent ROSC despite systemic TPA. Patient was placed on VA-ECMO and has found to have bilateral PE's and a thrombotic occlusion of his LCx. He underwent PCI of his LCx and thrombectomy/EKOS catheter placement for his pulmonary embolisms. His course has been complicated by intraventricular hemorrhage, diffuse cerebral edema, and brain herniation.     #Intracranial Hemorrhage with Diffuse Cerebral Edema and Herniation: This is likely a consequence of ischemic brain injury and blood thinners. His pupils are fixed and dilated on examination. No intervention at this point. This portends a poor prognosis and it is unlikely patient will recover any meaningful neurological recovery. Discussed with Neurology and NSGY who agree that he will be unlikely to have sighnificant neurological recovery. Discussed case with family. I elaborated that his degree of brain bleeding and herniation portends a poor prognosis. Family agrees and want to transition patient to comfort care. GOL contacted    #Cardiac Arrest: The etiology is likely due to his bilateral pulmonary emboli. Unclear why a patient in his 40's would develop massive PE's. Given cardiac arrest, will plan for therapeutic hypothermia for 24 hours at 34 degrees Celsius.     #Cardiogenic Shock: This is also due to his bilateral pulmonary emboli. Continue support with VA-ECMO and IABP.     #Bilateral  Pulmonary Embolism: Patient underwent EKOS catheter placement; however, TPA was stopped due to brain bleeding.     #Coronary Artery Disease: Patient had a thrombotic occlusion of his LCx. Will hold ASA/Brillenta at this time given brain bleeding. This will need to be discussed in the AM given recent PCI to dominant Cx.     #Aspiration Pneumonitis: Vancomycin/Zosyn for 5 days     #Lactic Acidosis: This is related to cardiac arrest and high pressor requirement. Will continue to trend.     Neurology: Intubated, sedated, paralyzed. Cooled to 34 degrees.  --continue versed, fentanyl, and cis as needed to maintain paralysis - RASS goal -4 to -5    Cardiovascular / Hemodynamics: Refractory PEA arrest.  KIARA to LCx.  Peripheral V-A ECMO inserted for hemodynamic support. LA 18.   TTE: Pending   EKG: Pending   --wean pressors/inotropes as able  --echo tomorrow with ECMO turndown  --continue ASA 81mg and ticagrelor 90mg BID  --hold temp at 34 for 24 hours   --ACT goal 180-200  --hold lipitor for now given likely hepatic injury during arrest  --hold ACE/ARB for now given likely reduced renal fxn after arrest  --holding beta blocker given shock      Pulmonary: --wean vent as able  --daily CXR  --Q2h ABGs for now  --consider scheduled duonebs if signs of lung dz, currently PRN     GI and Nutrition: No known medical hx.    --monitor BID LFTs  --NPO while cooled - nutrition consult pending feeding tube placement once he is warmed   --bowel regimen - on hold for now due to hypothermia  --GI Prophylaxis: PPI    Renal, Fluid and Electrolytes: --monitor urine output  --maintain K>3 and Mg>2    Infectious Disease: No signs of infection. Leukocytosis c/w arrest. Blood cultures collected.   --vancomycin/zosyn x5 days for ECMO  --daily blood cultures  --monitor for signs of infection given cooling, lines, and leukocytosis   Hematology and Oncology: Receiving heparin for ECMO and ASA/ticagrelor for KIARA.   --cryo PRN fibrinogen < 200; FFP for  INR >2  --Transfuse for Hgb<8  --heparin gtt for ECMO with ACT goal 180-200 and Xa of 0.3 to 0.7.   --US LE w/ arterial duplex per ECMO protocol   --DVT PPX: Heparin as above   Endocrinology: No known medical history. BG elevated.  --insulin gtt  --f/u HgbA1c                Suzie Alvarez MD  Cardiology Fellow  PGY4      June 20, 2020      I have seen and examined the patient with the [facility] team. I agree with the assessment and plan of the note above.I have reviewed pertinent labs.     Binh Serrato MD  Interventional Cardiology  Pager: [phone]]

## 2020-06-22 LAB — KCT BLD-ACNC: 319 SEC (ref 75–150)

## 2020-06-23 ENCOUNTER — TELEPHONE (OUTPATIENT)
Dept: CARDIOLOGY | Facility: CLINIC | Age: 50
End: 2020-06-23

## 2020-06-23 LAB
BLD PROD TYP BPU: NORMAL
BLD UNIT ID BPU: 0
BLOOD PRODUCT CODE: NORMAL
BPU ID: NORMAL
MISCELLANEOUS TEST: NORMAL
NSE SERPL-MCNC: NORMAL UG/L
NSE SERPL-MCNC: NORMAL UG/L
TRANSFUSION STATUS PATIENT QL: NORMAL
TRANSFUSION STATUS PATIENT QL: NORMAL

## 2020-06-23 NOTE — TELEPHONE ENCOUNTER
Per Dr. Lee, death certificate signed.  Writer attempted to contact caller, caller not available-detailed message left and to call back with any questions.    Luda Thompson, RN  Cardiology Care Coordinator  [facility]  [phone] option 1

## 2020-06-23 NOTE — TELEPHONE ENCOUNTER
[facility] Call Center    Phone Message    May a detailed message be left on voicemail: yes     Reason for Call: IF Dr. Serrato is not the provider completing/signing this death certificate, please let Bette know asap. Thank you.      Action Taken: Message routed to:  [facility] ([facility]    Travel Screening: Not Applicable

## 2020-06-23 NOTE — TELEPHONE ENCOUNTER
Health Call Center    Phone Message    May a detailed message be left on voicemail: yes    Reason for Call: Other: Bette from [facility] called to report that in the MR&C profile the case was reassigned back to the medical examiner's office after Dr. Serrato or authorized users sent it back to them for signature of the death certificate.   Bette states this is not appropriate; if Dr. Serrato is not comfortable signing the death certificate (his name was provided by hospital staff who attended this Pt) then Dr. Serrato or his staff must call the medical examiner's office and walk them through completing this, and MUST still sign the death certificate, since this is not a reportable case to the medical examiner's office. 'Multi-organ failure' was also not spelled correctly, which will appear on the official death record, which should be corrected.   Bette stated they are trying to cremate this Pt TODAY since service is scheduled for 20.   Medical Examiner's Office info:  [facility] Medical Examiner's Office  Ph # [phone]  Please also call Bette back for any questions/concerns.      Action Taken: Other: [facility]    Travel Screening: Not Applicable

## 2020-06-29 LAB — S100 CA BINDING PROTEIN B SER-MCNC: 9085 NG/L (ref 0–96)

## 2020-06-30 LAB
7AMINOCLONAZEPAM BLD CFM-MCNC: NEGATIVE NG/ML
ALPRAZ SERPL-MCNC: NEGATIVE NG/ML
AMPHETAMINES SPEC-MCNC: NEGATIVE NG/ML
APAP BLD-MCNC: NEGATIVE UG/ML
BARBITURATES SPEC-MCNC: NEGATIVE UG/ML
BENZODIAZ SERPL QL: POSITIVE
BENZODIAZ SPEC-MCNC: POSITIVE NG/ML
BUPRENORPHINE SERPLBLD-MCNC: NEGATIVE NG/ML
CARBOXYTHC BLD-MCNC: NEGATIVE NG/ML
CARISOPRODOL IA: NEGATIVE UG/ML
CHLORDIAZEP SERPL-MCNC: NEGATIVE NG/ML
CLONAZEPAM SERPL CFM-MCNC: NEGATIVE NG/ML
COCAINE METABOLITE IA: NEGATIVE NG/ML
DECLARED MEDICATIONS: ABNORMAL
DESALKYLFLURAZ SERPL-MCNC: NEGATIVE NG/ML
DIAZEPAM SERPL-MCNC: NEGATIVE NG/ML
ETHANOL BLD-MCNC: NEGATIVE GM/DL
FENTANYL IA: NEGATIVE NG/ML
FLURAZEPAM SERPL-MCNC: NEGATIVE NG/ML
GABAPENTIN IA: NEGATIVE UG/ML
LORAZEPAM BLD CFM-MCNC: NEGATIVE NG/ML
MEPERIDINE SERPLBLD-MCNC: NEGATIVE NG/ML
METHADONE BLD-MCNC: NEGATIVE NG/ML
MIDAZOLAM BLD CFM-MCNC: 143 NG/ML
NORCHLORDIAZEP SERPL-MCNC: NEGATIVE NG/ML
NORDIAZEPAM SERPL-MCNC: NEGATIVE NG/ML
OPIATES SPEC-MCNC: NEGATIVE NG/ML
OTHER DRUGS DETECTED: ABNORMAL
OXAZEPAM SERPL-MCNC: NEGATIVE NG/ML
OXYCODONE SERPLBLD SCN-MCNC: NEGATIVE NG/ML
PCP SPEC-MCNC: NEGATIVE NG/ML
PROPOXYPH SPEC-MCNC: NEGATIVE NG/ML
TEMAZEPAM SERPL-MCNC: NEGATIVE NG/ML
TRAMADOL BLD-MCNC: NEGATIVE NG/ML
TRIAZOLAM BLD-MCNC: NEGATIVE NG/ML

## (undated) DEVICE — Device

## (undated) DEVICE — INTRO SHEATH MICRO PLATINUM TIP 4FRX40CM 7274

## (undated) DEVICE — SET TBG 3W STPCK M LL CONN LO

## (undated) DEVICE — KIT ARTERIAL LINE 2GA 12CM INTRO NDL ASK-04510-HF

## (undated) DEVICE — PACK HEART LEFT CUSTOM

## (undated) DEVICE — CANNULA VENOUS 25FR LONG

## (undated) DEVICE — CATH BALLOON EMERGE 2.0X12MM H7493918912200

## (undated) DEVICE — CATH ANGIO INFINITI 3DRC 6FRX100CM 534676T

## (undated) DEVICE — 17FR 23CM ARTERIAL ECMO CANNULA WITH BIOLINE COATING

## (undated) DEVICE — INTRO SHEATH 9FRX10CM PINNACLE RSS902

## (undated) DEVICE — CATH BALLOON NC EMERGE 3.00X15MM H7493926715300

## (undated) DEVICE — CATH ANGIO SUPERTORQUE PLUS JL4 6FRX100CM 533620

## (undated) DEVICE — KIT ACCESSORY INTRO INFLATION SYS 20/30 PRIORITY 1000186-115

## (undated) DEVICE — GUIDEWIRE VASC 0.014INX180CM RUNTHROUGH 25-1011

## (undated) DEVICE — CATH BALLOON EMERGE 2.5X12MM H7493918912250

## (undated) DEVICE — TRAY CATH 8IN 7FR ARROWG+ ARD CDC-45703-1A

## (undated) DEVICE — MANIFOLD KIT ANGIO AUTOMATED 014613

## (undated) DEVICE — CATH GUIDING BLUE YELLOW PTFE XB3.5 6FRX100CM 67005400

## (undated) DEVICE — INTRO SHEATH 6FRX25CM PINNACLE RSS606

## (undated) DEVICE — CATH ANGIO INFINITI PIGTAIL 145 6 SH 6FRX110CM  534-652S

## (undated) DEVICE — DEVICE CATH STATLOCK INTRA-AORTIC BALL PUMP 0684-00-0472

## (undated) DEVICE — WIRE GUIDE 0.035"X145CM AMPLATZ XSTIFF J THSCF-35-145-3-AES

## (undated) DEVICE — VALVE HEMOSTASIS .096" COPILOT MECH 1003331

## (undated) DEVICE — KIT HAND CONTROL ACIST 014644 AR-P54

## (undated) RX ORDER — HEPARIN SODIUM 10000 [USP'U]/100ML
INJECTION, SOLUTION INTRAVENOUS
Status: DISPENSED
Start: 2020-01-01

## (undated) RX ORDER — HEPARIN SODIUM 1000 [USP'U]/ML
INJECTION, SOLUTION INTRAVENOUS; SUBCUTANEOUS
Status: DISPENSED
Start: 2020-01-01

## (undated) RX ORDER — ASPIRIN 325 MG
TABLET ORAL
Status: DISPENSED
Start: 2020-01-01